# Patient Record
Sex: FEMALE | Race: BLACK OR AFRICAN AMERICAN | Employment: UNEMPLOYED | URBAN - METROPOLITAN AREA
[De-identification: names, ages, dates, MRNs, and addresses within clinical notes are randomized per-mention and may not be internally consistent; named-entity substitution may affect disease eponyms.]

---

## 2021-10-31 ENCOUNTER — APPOINTMENT (OUTPATIENT)
Dept: GENERAL RADIOLOGY | Age: 31
End: 2021-10-31
Attending: EMERGENCY MEDICINE
Payer: MEDICAID

## 2021-10-31 ENCOUNTER — HOSPITAL ENCOUNTER (OUTPATIENT)
Age: 31
Setting detail: OBSERVATION
Discharge: NURSING, REHAB OR LONG-TERM CARE | End: 2021-11-05
Attending: EMERGENCY MEDICINE | Admitting: STUDENT IN AN ORGANIZED HEALTH CARE EDUCATION/TRAINING PROGRAM
Payer: MEDICAID

## 2021-10-31 DIAGNOSIS — G62.9 NEUROPATHY: ICD-10-CM

## 2021-10-31 DIAGNOSIS — E86.0 DEHYDRATION: Primary | ICD-10-CM

## 2021-10-31 DIAGNOSIS — R26.2 INABILITY TO WALK: ICD-10-CM

## 2021-10-31 DIAGNOSIS — R44.3 HALLUCINATIONS: ICD-10-CM

## 2021-10-31 DIAGNOSIS — R53.1 WEAKNESS: ICD-10-CM

## 2021-10-31 DIAGNOSIS — R53.83 FATIGUE, UNSPECIFIED TYPE: ICD-10-CM

## 2021-10-31 LAB
BASOPHILS # BLD: 0.1 K/UL (ref 0–0.1)
BASOPHILS NFR BLD: 1 % (ref 0–1)
DIFFERENTIAL METHOD BLD: ABNORMAL
EOSINOPHIL # BLD: 0 K/UL (ref 0–0.4)
EOSINOPHIL NFR BLD: 0 % (ref 0–7)
ERYTHROCYTE [DISTWIDTH] IN BLOOD BY AUTOMATED COUNT: 16.4 % (ref 11.5–14.5)
HCT VFR BLD AUTO: 41.7 % (ref 35–47)
HGB BLD-MCNC: 14.2 G/DL (ref 11.5–16)
IMM GRANULOCYTES # BLD AUTO: 0.3 K/UL (ref 0–0.04)
IMM GRANULOCYTES NFR BLD AUTO: 4 % (ref 0–0.5)
LYMPHOCYTES # BLD: 2.7 K/UL (ref 0.8–3.5)
LYMPHOCYTES NFR BLD: 33 % (ref 12–49)
MCH RBC QN AUTO: 34.1 PG (ref 26–34)
MCHC RBC AUTO-ENTMCNC: 34.1 G/DL (ref 30–36.5)
MCV RBC AUTO: 100.2 FL (ref 80–99)
MONOCYTES # BLD: 0.4 K/UL (ref 0–1)
MONOCYTES NFR BLD: 5 % (ref 5–13)
NEUTS SEG # BLD: 4.8 K/UL (ref 1.8–8)
NEUTS SEG NFR BLD: 57 % (ref 32–75)
NRBC # BLD: 0.59 K/UL (ref 0–0.01)
NRBC BLD-RTO: 7.1 PER 100 WBC
PLATELET # BLD AUTO: 390 K/UL (ref 150–400)
PMV BLD AUTO: 10.5 FL (ref 8.9–12.9)
RBC # BLD AUTO: 4.16 M/UL (ref 3.8–5.2)
RBC MORPH BLD: ABNORMAL
WBC # BLD AUTO: 8.3 K/UL (ref 3.6–11)

## 2021-10-31 PROCEDURE — 99285 EMERGENCY DEPT VISIT HI MDM: CPT

## 2021-10-31 PROCEDURE — 85025 COMPLETE CBC W/AUTO DIFF WBC: CPT

## 2021-10-31 PROCEDURE — 96360 HYDRATION IV INFUSION INIT: CPT

## 2021-10-31 PROCEDURE — 96361 HYDRATE IV INFUSION ADD-ON: CPT

## 2021-10-31 PROCEDURE — 36415 COLL VENOUS BLD VENIPUNCTURE: CPT

## 2021-10-31 PROCEDURE — 71045 X-RAY EXAM CHEST 1 VIEW: CPT

## 2021-10-31 PROCEDURE — 74011250636 HC RX REV CODE- 250/636: Performed by: EMERGENCY MEDICINE

## 2021-10-31 RX ADMIN — SODIUM CHLORIDE 1000 ML: 9 INJECTION, SOLUTION INTRAVENOUS at 23:19

## 2021-11-01 ENCOUNTER — APPOINTMENT (OUTPATIENT)
Dept: CT IMAGING | Age: 31
End: 2021-11-01
Attending: EMERGENCY MEDICINE
Payer: MEDICAID

## 2021-11-01 ENCOUNTER — APPOINTMENT (OUTPATIENT)
Dept: MRI IMAGING | Age: 31
End: 2021-11-01
Attending: STUDENT IN AN ORGANIZED HEALTH CARE EDUCATION/TRAINING PROGRAM
Payer: MEDICAID

## 2021-11-01 ENCOUNTER — APPOINTMENT (OUTPATIENT)
Dept: VASCULAR SURGERY | Age: 31
End: 2021-11-01
Attending: STUDENT IN AN ORGANIZED HEALTH CARE EDUCATION/TRAINING PROGRAM
Payer: MEDICAID

## 2021-11-01 ENCOUNTER — APPOINTMENT (OUTPATIENT)
Dept: GENERAL RADIOLOGY | Age: 31
End: 2021-11-01
Attending: STUDENT IN AN ORGANIZED HEALTH CARE EDUCATION/TRAINING PROGRAM
Payer: MEDICAID

## 2021-11-01 PROBLEM — R53.1 RAPIDLY PROGRESSIVE WEAKNESS: Status: ACTIVE | Noted: 2021-11-01

## 2021-11-01 LAB
ALBUMIN SERPL-MCNC: 2.6 G/DL (ref 3.5–5)
ALBUMIN/GLOB SERPL: 0.8 {RATIO} (ref 1.1–2.2)
ALP SERPL-CCNC: 70 U/L (ref 45–117)
ALT SERPL-CCNC: 23 U/L (ref 12–78)
AMMONIA PLAS-SCNC: 20 UMOL/L
AMPHET UR QL SCN: NEGATIVE
ANION GAP SERPL CALC-SCNC: 16 MMOL/L (ref 5–15)
APPEARANCE UR: CLEAR
AST SERPL-CCNC: 43 U/L (ref 15–37)
BACTERIA URNS QL MICRO: ABNORMAL /HPF
BARBITURATES UR QL SCN: NEGATIVE
BENZODIAZ UR QL: NEGATIVE
BILIRUB SERPL-MCNC: 1.7 MG/DL (ref 0.2–1)
BILIRUB UR QL CFM: POSITIVE
BUN SERPL-MCNC: 42 MG/DL (ref 6–20)
BUN/CREAT SERPL: 49 (ref 12–20)
CALCIUM SERPL-MCNC: 8.2 MG/DL (ref 8.5–10.1)
CANNABINOIDS UR QL SCN: NEGATIVE
CHLORIDE SERPL-SCNC: 100 MMOL/L (ref 97–108)
CO2 SERPL-SCNC: 18 MMOL/L (ref 21–32)
COCAINE UR QL SCN: NEGATIVE
COLOR UR: ABNORMAL
CREAT SERPL-MCNC: 0.86 MG/DL (ref 0.55–1.02)
D DIMER PPP FEU-MCNC: 3.48 MG/L FEU (ref 0–0.65)
DRUG SCRN COMMENT,DRGCM: NORMAL
EPITH CASTS URNS QL MICRO: ABNORMAL /LPF
ETHANOL SERPL-MCNC: <10 MG/DL
GLOBULIN SER CALC-MCNC: 3.2 G/DL (ref 2–4)
GLUCOSE SERPL-MCNC: 95 MG/DL (ref 65–100)
GLUCOSE UR STRIP.AUTO-MCNC: NEGATIVE MG/DL
HCG SERPL-ACNC: <1 MIU/ML (ref 0–6)
HGB UR QL STRIP: NEGATIVE
KETONES UR QL STRIP.AUTO: 15 MG/DL
LEUKOCYTE ESTERASE UR QL STRIP.AUTO: ABNORMAL
MAGNESIUM SERPL-MCNC: 2.1 MG/DL (ref 1.6–2.4)
METHADONE UR QL: NEGATIVE
NITRITE UR QL STRIP.AUTO: POSITIVE
OPIATES UR QL: NEGATIVE
PCP UR QL: NEGATIVE
PH UR STRIP: 6 [PH] (ref 5–8)
POTASSIUM SERPL-SCNC: 4.4 MMOL/L (ref 3.5–5.1)
PROT SERPL-MCNC: 5.8 G/DL (ref 6.4–8.2)
PROT UR STRIP-MCNC: NEGATIVE MG/DL
RBC #/AREA URNS HPF: ABNORMAL /HPF (ref 0–5)
SODIUM SERPL-SCNC: 134 MMOL/L (ref 136–145)
SP GR UR REFRACTOMETRY: >1.03 (ref 1–1.03)
TSH SERPL DL<=0.05 MIU/L-ACNC: 4 UIU/ML (ref 0.36–3.74)
URATE SERPL-MCNC: 8.8 MG/DL (ref 2.6–6)
UROBILINOGEN UR QL STRIP.AUTO: 1 EU/DL (ref 0.2–1)
WBC URNS QL MICRO: ABNORMAL /HPF (ref 0–4)

## 2021-11-01 PROCEDURE — 83735 ASSAY OF MAGNESIUM: CPT

## 2021-11-01 PROCEDURE — 51702 INSERT TEMP BLADDER CATH: CPT

## 2021-11-01 PROCEDURE — 85379 FIBRIN DEGRADATION QUANT: CPT

## 2021-11-01 PROCEDURE — 80307 DRUG TEST PRSMV CHEM ANLYZR: CPT

## 2021-11-01 PROCEDURE — 82140 ASSAY OF AMMONIA: CPT

## 2021-11-01 PROCEDURE — 93971 EXTREMITY STUDY: CPT

## 2021-11-01 PROCEDURE — 97165 OT EVAL LOW COMPLEX 30 MIN: CPT | Performed by: OCCUPATIONAL THERAPIST

## 2021-11-01 PROCEDURE — 96361 HYDRATE IV INFUSION ADD-ON: CPT

## 2021-11-01 PROCEDURE — 81001 URINALYSIS AUTO W/SCOPE: CPT

## 2021-11-01 PROCEDURE — 71275 CT ANGIOGRAPHY CHEST: CPT

## 2021-11-01 PROCEDURE — 99218 HC RM OBSERVATION: CPT

## 2021-11-01 PROCEDURE — 93971 EXTREMITY STUDY: CPT | Performed by: INTERNAL MEDICINE

## 2021-11-01 PROCEDURE — 74011000636 HC RX REV CODE- 636: Performed by: EMERGENCY MEDICINE

## 2021-11-01 PROCEDURE — 87086 URINE CULTURE/COLONY COUNT: CPT

## 2021-11-01 PROCEDURE — 97530 THERAPEUTIC ACTIVITIES: CPT

## 2021-11-01 PROCEDURE — 74011250637 HC RX REV CODE- 250/637: Performed by: STUDENT IN AN ORGANIZED HEALTH CARE EDUCATION/TRAINING PROGRAM

## 2021-11-01 PROCEDURE — 80053 COMPREHEN METABOLIC PANEL: CPT

## 2021-11-01 PROCEDURE — 84702 CHORIONIC GONADOTROPIN TEST: CPT

## 2021-11-01 PROCEDURE — 97112 NEUROMUSCULAR REEDUCATION: CPT | Performed by: OCCUPATIONAL THERAPIST

## 2021-11-01 PROCEDURE — 84550 ASSAY OF BLOOD/URIC ACID: CPT

## 2021-11-01 PROCEDURE — 36415 COLL VENOUS BLD VENIPUNCTURE: CPT

## 2021-11-01 PROCEDURE — 70450 CT HEAD/BRAIN W/O DYE: CPT

## 2021-11-01 PROCEDURE — G0378 HOSPITAL OBSERVATION PER HR: HCPCS

## 2021-11-01 PROCEDURE — 74011250636 HC RX REV CODE- 250/636: Performed by: EMERGENCY MEDICINE

## 2021-11-01 PROCEDURE — 97162 PT EVAL MOD COMPLEX 30 MIN: CPT

## 2021-11-01 PROCEDURE — 70551 MRI BRAIN STEM W/O DYE: CPT

## 2021-11-01 PROCEDURE — 84443 ASSAY THYROID STIM HORMONE: CPT

## 2021-11-01 PROCEDURE — 82077 ASSAY SPEC XCP UR&BREATH IA: CPT

## 2021-11-01 PROCEDURE — 73630 X-RAY EXAM OF FOOT: CPT

## 2021-11-01 RX ORDER — COLCHICINE 0.6 MG/1
1.2 TABLET ORAL DAILY
Status: DISCONTINUED | OUTPATIENT
Start: 2021-11-01 | End: 2021-11-02

## 2021-11-01 RX ORDER — GABAPENTIN 100 MG/1
200 CAPSULE ORAL 4 TIMES DAILY
COMMUNITY

## 2021-11-01 RX ORDER — COLCHICINE 0.6 MG/1
0.6 TABLET ORAL ONCE
Status: COMPLETED | OUTPATIENT
Start: 2021-11-01 | End: 2021-11-01

## 2021-11-01 RX ORDER — ACETAMINOPHEN 325 MG/1
650 TABLET ORAL
Status: DISCONTINUED | OUTPATIENT
Start: 2021-11-01 | End: 2021-11-05 | Stop reason: HOSPADM

## 2021-11-01 RX ORDER — FOLIC ACID 1 MG/1
1 TABLET ORAL DAILY
Status: DISCONTINUED | OUTPATIENT
Start: 2021-11-01 | End: 2021-11-05 | Stop reason: HOSPADM

## 2021-11-01 RX ORDER — POLYETHYLENE GLYCOL 3350 17 G/17G
17 POWDER, FOR SOLUTION ORAL DAILY PRN
Status: DISCONTINUED | OUTPATIENT
Start: 2021-11-01 | End: 2021-11-05 | Stop reason: HOSPADM

## 2021-11-01 RX ORDER — SODIUM CHLORIDE 0.9 % (FLUSH) 0.9 %
5-40 SYRINGE (ML) INJECTION AS NEEDED
Status: DISCONTINUED | OUTPATIENT
Start: 2021-11-01 | End: 2021-11-05 | Stop reason: HOSPADM

## 2021-11-01 RX ORDER — SODIUM CHLORIDE 0.9 % (FLUSH) 0.9 %
5-40 SYRINGE (ML) INJECTION EVERY 8 HOURS
Status: DISCONTINUED | OUTPATIENT
Start: 2021-11-01 | End: 2021-11-05 | Stop reason: HOSPADM

## 2021-11-01 RX ORDER — ONDANSETRON 4 MG/1
4 TABLET, ORALLY DISINTEGRATING ORAL
Status: DISCONTINUED | OUTPATIENT
Start: 2021-11-01 | End: 2021-11-05 | Stop reason: HOSPADM

## 2021-11-01 RX ORDER — LANOLIN ALCOHOL/MO/W.PET/CERES
100 CREAM (GRAM) TOPICAL DAILY
Status: DISCONTINUED | OUTPATIENT
Start: 2021-11-01 | End: 2021-11-05 | Stop reason: HOSPADM

## 2021-11-01 RX ORDER — DULOXETIN HYDROCHLORIDE 30 MG/1
30 CAPSULE, DELAYED RELEASE ORAL DAILY
COMMUNITY

## 2021-11-01 RX ORDER — ENOXAPARIN SODIUM 100 MG/ML
40 INJECTION SUBCUTANEOUS DAILY
Status: DISCONTINUED | OUTPATIENT
Start: 2021-11-02 | End: 2021-11-05 | Stop reason: HOSPADM

## 2021-11-01 RX ORDER — ACETAMINOPHEN 650 MG/1
650 SUPPOSITORY RECTAL
Status: DISCONTINUED | OUTPATIENT
Start: 2021-11-01 | End: 2021-11-05 | Stop reason: HOSPADM

## 2021-11-01 RX ORDER — ONDANSETRON 2 MG/ML
4 INJECTION INTRAMUSCULAR; INTRAVENOUS
Status: DISCONTINUED | OUTPATIENT
Start: 2021-11-01 | End: 2021-11-05 | Stop reason: HOSPADM

## 2021-11-01 RX ORDER — CEPHALEXIN 250 MG/1
500 CAPSULE ORAL EVERY 6 HOURS
Status: DISCONTINUED | OUTPATIENT
Start: 2021-11-01 | End: 2021-11-02

## 2021-11-01 RX ADMIN — Medication 10 ML: at 16:21

## 2021-11-01 RX ADMIN — SODIUM CHLORIDE 1000 ML: 9 INJECTION, SOLUTION INTRAVENOUS at 01:41

## 2021-11-01 RX ADMIN — CEPHALEXIN 500 MG: 250 CAPSULE ORAL at 18:28

## 2021-11-01 RX ADMIN — ACETAMINOPHEN 650 MG: 325 TABLET ORAL at 19:01

## 2021-11-01 RX ADMIN — COLCHICINE 1.2 MG: 0.6 TABLET ORAL at 13:10

## 2021-11-01 RX ADMIN — CEPHALEXIN 500 MG: 250 CAPSULE ORAL at 13:10

## 2021-11-01 RX ADMIN — COLCHICINE 0.6 MG: 0.6 TABLET ORAL at 16:20

## 2021-11-01 RX ADMIN — Medication 10 ML: at 22:00

## 2021-11-01 RX ADMIN — IOPAMIDOL 100 ML: 755 INJECTION, SOLUTION INTRAVENOUS at 02:05

## 2021-11-01 RX ADMIN — FOLIC ACID 1 MG: 1 TABLET ORAL at 13:10

## 2021-11-01 RX ADMIN — Medication 100 MG: at 13:10

## 2021-11-01 NOTE — ED NOTES
Bedside and Verbal shift change report given to Kellee Jaquez (oncoming nurse) by Jeremy Veloz (offgoing nurse). Report included the following information SBAR.

## 2021-11-01 NOTE — ED PROVIDER NOTES
EMERGENCY DEPARTMENT HISTORY AND PHYSICAL EXAM      Date: 10/31/2021  Patient Name: Abby Amos    History of Presenting Illness     Chief Complaint   Patient presents with    Fatigue     numerous complaints       History Provided By: Patient    HPI: Abby Amos, 28 y.o. female with PMHx as noted below presents the emergency department for evaluation of fatigue and confusion. Patient states reason for coming to the hospital [facility] is because \"my  thought I was out of it\". Patient states she feels somewhat fatigued and generalized weakness with difficulty ambulating but notes this has been an ongoing issue for \"a while\". She also reports multiple other complaints including forgetfulness, balance issues but states she is currently under the care of a neurologist and this is being worked up on an outpatient basis, reports having MRIs, recent lumbar puncture. She reports to me that there are  no acute changes today but does state that her condition has been difficult to manage at home with her  is her caregiver. Patient states the reason she came in [facility] is because \"my  wanted me to get checked out\". Patient also mentions that she may be pregnant but is uncertain. Pt denies any other alleviating or exacerbating factors. Additionally, pt specifically denies any recent fever, chills, headache, nausea, vomiting, abdominal pain, CP, SOB, lightheadedness, dizziness, numbness, weakness, BLE swelling, heart palpitations, urinary sxs, diarrhea, constipation, melena, hematochezia, cough, or congestion. PCP: None    Current Outpatient Medications   Medication Sig Dispense Refill    traMADoL (ULTRAM) 50 mg tablet Take 1 Tablet by mouth every six (6) hours as needed (mod-severe pain) for up to 3 days. Max Daily Amount: 200 mg. 10 Tablet 0    metoprolol succinate (TOPROL-XL) 25 mg XL tablet Take 1 Tablet by mouth daily.  30 Tablet 0    melatonin 3 mg tablet Take 1 Tablet by mouth nightly as needed for Insomnia. 30 Tablet 0    allopurinoL (ZYLOPRIM) 100 mg tablet Take 1 Tablet by mouth daily. 30 Tablet 0    folic acid (FOLVITE) 1 mg tablet Take 1 mg by mouth daily.  ondansetron hcl (Zofran) 4 mg tablet Take 4 mg by mouth every eight (8) hours as needed for Nausea or Vomiting.  gabapentin (NEURONTIN) 100 mg capsule Take 200 mg by mouth four (4) times daily.  DULoxetine (CYMBALTA) 30 mg capsule Take 30 mg by mouth daily. Past History     Past Medical History:  Alcohol abuse, neuropathy, chronic weakness    Past Surgical History:  History reviewed. No pertinent surgical history. Family History:  History reviewed. No pertinent family history. Social History:  Social History     Tobacco Use    Smoking status: Never Smoker    Smokeless tobacco: Never Used   Substance Use Topics    Alcohol use: Yes     Comment: occasionally    Drug use: Not Currently       Allergies: Allergies   Allergen Reactions    Aspirin Swelling     Throat swelling         Review of Systems   Review of Systems  Constitutional: Negative for fever, chills. Positive fatigue. HENT: Negative for congestion, sore throat, rhinorrhea, sneezing and neck stiffness   Eyes: Negative for discharge and redness. Respiratory: Negative for  shortness of breath, wheezing   Cardiovascular: Negative for chest pain, palpitations   Gastrointestinal: Negative for nausea, vomiting, abdominal pain, constipation, diarrhea and blood in stool. Genitourinary: Negative for dysuria, hematuria, flank pain, decreased urine volume, discharge,   Musculoskeletal: Negative for myalgias or joint pain . Skin: Negative for rash or lesions . Neurological: Positive weakness. Negative lightheadedness and headaches. Physical Exam   Physical Exam    GENERAL: alert and oriented, no acute distress  EYES: PEERL, No injection, discharge or icterus. ENT: Mucous membranes dry  NECK: Supple  LUNGS: Airway patent. Non-labored respirations. Breath sounds clear with good air entry bilaterally. HEART: .  Mild tachycardia, regular rhythm, no peripheral edema  ABDOMEN: Non-distended and non-tender, without guarding or rebound. SKIN:  warm, dry  MSK/EXTREMITIES: Without swelling, tenderness or deformity, symmetric with normal ROM  NEUROLOGICAL:  alert and oriented x 3, CN II-XII grossly intact, strength 4/5 bilateral upper and lower extremities, sensation intact throughout to light touch, no dysmetria or ataxia noted      Diagnostic Study Results     Labs -  Reviewed and interpreted by me    Radiologic Studies -   MRI CERV SPINE W WO CONT   Final Result   No stenosis. Limited study as described above. MRI BRAIN W WO CONT   Final Result   Abnormal T2 signal within the splenium of the corpus callosum. No new lesions or   abnormal enhancement. Differential remains infectious or inflammatory   demyelinating or metabolic etiologies. Neoplasm is felt to be less likely      MRI BRAIN WO CONT   Final Result   Abnormal FLAIR signal intensity predominantly in the splenium of the corpus   callosum with mild associated diffusion restriction. The patient was   administered IV contrast. Specific imaging finding, differential etiologies   include infectious/inflammatory process, metabolic encephalopathy, demyelinating   processes. Clinical correlation. Consider contrast-enhanced MRI of the brain and cervical spine for further   delineation. There is no intracranial mass, hemorrhage . No additional acute intracranial process is demonstrated. XR FOOT LT MIN 3 V   Final Result   Soft tissue swelling and diffuse osteopenia. No focal bone   destruction. .      DUPLEX LOWER EXT VENOUS LEFT   Final Result      CT HEAD WO CONT   Final Result   No significant abnormalities. CTA CHEST W OR W WO CONT   Final Result   No pulmonary embolus or other acute cardiopulmonary process. .      XR CHEST PORT   Final Result Normal chest.        CT Results  (Last 48 hours)    None        CXR Results  (Last 48 hours)    None            Medical Decision Making     I, Nabil Martinez MD am the first provider for this patient and am the attending of record for this patient encounter. I reviewed the vital signs, available nursing notes, past medical history, past surgical history, family history and social history. Vital Signs-Reviewed the patient's vital signs. No data found. Pulse Oximetry Analysis - 100% on RA      Records Reviewed: Nursing Notes and Old Medical Records    Provider Notes (Medical Decision Making): On presentation, the patient is well appearing, in no acute distress noted to be tachycardic on arrival.  Patient presenting here with reported progressive weakness over the last 1 to 2 years states that things are being difficult to manage at home, notes that her  felt that she was confused earlier today although patient is alert and oriented on my assessment. Patient reportedly has had all of this evaluated and worked up extensively by both neurology and rheumatology as an outpatient although I do not have access to these records reports recent MRIs and lumbar puncture with unclear diagnosis for the patient. Differential included a demyelinating disorder, CNS pathology, stroke, metabolic encephalopathy, infectious process, spinal cord lesion, other neuromuscular disorder. Basic labs were notable for a mild metabolic acidosis, felt likely dehydration as her mild tachycardia did improve with IV fluids. CT imaging showed no acute findings on my interpretation. Based on history and evaluation, no clear acute conditions identified at this time however I did recommend admission with the patient given her significant ambulatory dysfunction she may ultimately require placement in a rehab facility.   Patient declining admission at this time stating that she would like to go home and reports that she will call her  to come pick her up. Seeing as there appears to be nothing acute with her presentation today feel this is reasonable. ED Course:   Initial assessment performed. The patients presenting problems have been discussed, and they are in agreement with the care plan formulated and outlined with them. I have encouraged them to ask questions as they arise throughout their visit. Medications   sodium chloride 0.9 % bolus infusion 1,000 mL (0 mL IntraVENous IV Completed 11/1/21 0030)   sodium chloride 0.9 % bolus infusion 1,000 mL (0 mL IntraVENous IV Completed 11/1/21 0412)   iopamidoL (ISOVUE-370) 76 % injection 100 mL (100 mL IntraVENous Given 11/1/21 0205)   colchicine tablet 0.6 mg (0.6 mg Oral Given 11/1/21 1620)   colchicine tablet 0.6 mg (0.6 mg Oral Given 11/4/21 0859)   gadoteridoL (PROHANCE) 279.3 mg/mL contrast solution 19 mL (19 mL IntraVENous Given 11/2/21 1545)   methylPREDNISolone (Solu-MEDROL) 500 mg in 100 mL NS MBP (0 mg IntraVENous Stopped 11/5/21 1030)   potassium bicarb-citric acid (EFFER-K) tablet 50 mEq (50 mEq Oral Given 11/4/21 1119)   methylPREDNISolone (Solu-MEDROL) 500 mg in 100 mL NS MBP (500 mg IntraVENous New Bag 11/4/21 1808)   gadoteridoL (PROHANCE) 279.3 mg/mL contrast solution 20 mL (20 mL IntraVENous Given 11/4/21 1702)   LORazepam (ATIVAN) injection 1 mg (1 mg IntraVENous Given 11/4/21 1649)          SIGN OUT:  Patient's presentation, labs/imaging and plan of care was reviewed with Dr. Hakeem Kingston awaiting transportation home. Dr. Chico Alejandro assistance in completion of this plan is greatly appreciated but it should be noted that I will be the provider of record for this patient. Rosana Kennedy MD      ED Course as of 11/07/21 1108   Mon Nov 01, 2021   1102 Progress Note  11:02 AM  I was told by nursing that patient is unable to transfer from the wheelchair to the car. This patient was signed out to me waiting for transportation home.   After speaking to both the patient and her , she has had a demyelinating disease for some time that has worsened over the past week to the point that she can't walk. Will discuss with Neurology and hospitalist. [MS]   1107 11:07 AM  Debbie Ferreira MD spoke with Dr. Rand Hanna for Neurology. Discussed HPI and PE, available diagnostic tests and clinical findings. He is in agreement with care plans as outlined. He recommends admitting her to the hospital for further evaluation and work-up. He recommends MRI. If nothing is found, he recommends a psychiatry consult. [MS]      ED Course User Index  [MS] Dominique Garvey MD     Critical Care Note      IMPENDING DETERIORATION -Cardiovascular and Metabolic  ASSOCIATED RISK FACTORS - Dehydration  MANAGEMENT- Bedside Assessment and Supervision of Care  INTERPRETATION -  CT Scan, ECG and Blood Pressure  INTERVENTIONS - hemodynamic mngmt-IV fluid boluses x2  CASE REVIEW - Hospitalist/Intensivist  TREATMENT RESPONSE -Stable  PERFORMED BY - Gisselle Najera MD    NOTES   :  I have provided a total of 45 minutes of critical time not including time spent on separately documented procedures. The reason for providing this level of medical care for this critically ill patient was due to a critical illness that impaired one or more vital organ systems such that there was a high probability of imminent or life threatening deterioration in the patients condition. This care involved high complexity decision making to assess, manipulate, and support vital system functions,  lab review, consultations with specialist, family decision- making, bedside attention and documentation. During this entire length of time I was immediately available to the patient      Admit Note  Patient is being admitted to the hospitalist.  The results of their tests and reasons for their admission have been discussed with them and/or available family.   They convey agreement and understanding for the need to be admitted and for their admission diagnosis. Consultation has been made with the inpatient physician specialist for hospitalization. Disposition:  admit    PLAN:  1. admit      Diagnosis     Clinical Impression:   1. Dehydration    2. Fatigue, unspecified type    3. Neuropathy    4. Hallucinations    5. Weakness    6. Inability to walk        Please note that this dictation was completed with Dragon, computer voice recognition software. Quite often unanticipated grammatical, syntax, homophones, and other interpretive errors are inadvertently transcribed by the computer software. Please disregard these errors. Additionally, please excuse any errors that have escaped final proofreading.

## 2021-11-01 NOTE — PROGRESS NOTES
[facility] Admission Pharmacy Medication Reconciliation- IN PROGRESS    Information obtained from: Liališgayle Solano ([phone])   RxQuery data available1: Yes     Comments/recommendations:    Unable to perform medication reconciliation with patient due to patient condition at this time. Compliance could not be assessed. Pharmacist to re-attempt to interview patient tomorrow.  PTA medication list updated from most recent dispense history from [facility]  ([phone]).  Patient picked up 30 days supply of gabapentin on 10/24/21 and duloxetine on 10/12/21.  No other medications were found on patient profile. Medication changes (since last review): Added   Gabapentin   Duloxetine  Removed   None  Adjusted   None    The [place] Prescription Monitoring Program () was accessed to determine fill history of any controlled medications. Last filled Gabapentin 100 mg Qty 240; Qty 30 on 10/23/21.    1RxQuery pharmacy benefit data reflects medications filled and processed through the patient's insurance, however                this data does NOT capture whether the medication was picked up or is currently being taken by the patient. Patient allergies: Allergies as of 10/31/2021 - Fully Reviewed 10/31/2021   Allergen Reaction Noted    Aspirin Swelling 10/31/2021     Prior to Admission Medications   Prescriptions Last Dose Informant Patient Reported? Taking? DULoxetine (CYMBALTA) 30 mg capsule Unknown at Unknown time Other Yes No   Sig: Take 30 mg by mouth daily. gabapentin (NEURONTIN) 100 mg capsule Unknown at Unknown time Other Yes No   Sig: Take 200 mg by mouth four (4) times daily.       Facility-Administered Medications: None     Thank you,     Krystle Peres, PharmD   Contact: [phone]

## 2021-11-01 NOTE — H&P
Hospitalist Admission Note    NAME: Dulce Ragland   :  1990   MRN:  [id number]   Room Number: ER03/03  @ [facility]     Date/Time:  2021 11:15 AM    Patient PCP: None  ______________________________________________________________________  Given the patient's current clinical presentation, I have a high level of concern for decompensation if discharged from the emergency department. Complex decision making was performed, which includes reviewing the patient's available past medical records, laboratory results, and x-ray films. My assessment of this patient's clinical condition and my plan of care is as follows. Assessment / Plan:        Active Problems:    Rapidly progressive weakness (2021)        #Alcohol induced neuropathy  #Chronic lower extremity weakness  -CT head negative for acute process  -Extensive work-up done at [facility]-mostly unremarkable  - LP done 3/1/2021: WBC 2 protein 24.3 glucose 63  -VDRL CSF nonreactive nonreactive, herpes 1 and 2 PCR CSF negative  -HIV neg  2021  -Hepatitis B and C test  Neg 2020  -Double-stranded DNA negative, centromere antibody negative, SSA SS- B antibody negative Aurora 1 antibody - 2021  -Scleroderma 70- 2.9  -MRI pending  -Neurology consulted  -Check B12 folate RPR  -Follow free T4  -PT OT recommend acute rehab      #UTI  -UA with bacteria, leukoesterase and positive nitrites  -Urine culture pending  -Keflex for 3 days    # Macrocytosis suspect secondary to alcohol use  -  -B12 [id number]  -Thiamine folic acid    #Left foot gout attack  -Check uric acid  -Colchicine 1.2 mg and then colchicine 0.6 mg an hour after    #Alcohol use disorder  -Patient states she was a daily drinker until her first born child and then she drinks couple of times a week  -Patient history of alcohol withdrawal in the past  -CIWA protocol without Ativan  -Thiamine folic acid    Body mass index is 38.79 kg/m². Code Status: Full   Surrogate Decision Maker:    DVT Prophylaxis: Lovenox  GI Prophylaxis: not indicated          Subjective:   CHIEF COMPLAINT: Chronic leg weakness     HISTORY OF PRESENT ILLNESS:     Marika Nguyen is a 28 y.o.  female with PMH of above-mentioned problems who presents to ED with c/o *lower extremity weakness and unable to take care of herself at home. Patient states that she has been diagnosed with neuropathy 4 years ago and she has been declining for past 1-1/2-year. Patient is wheelchair-bound and is taken care of by her  at home with 3 kids. Patient today denied any active chest pain belly pain difficulty breathing or shortness of breath. Patient endorsed pain in left foot. Patient stated that she used to drink alcohol every single day and used to have alcohol withdrawal when she would remain abstinent from alcohol. But after her first born she has cut back and now she drinks few days a week. Last drink was yesterday. Patient  was not able to take care of her at home at this time. We were asked to admit for work up and evaluation of the above problems. History reviewed. No pertinent past medical history. History reviewed. No pertinent surgical history. Social History     Tobacco Use    Smoking status: Never Smoker    Smokeless tobacco: Never Used   Substance Use Topics    Alcohol use: Yes     Comment: occasionally        History reviewed. No pertinent family history. Allergies   Allergen Reactions    Aspirin Swelling     Throat swelling        Prior to Admission medications    Not on File       REVIEW OF SYSTEMS:     I am not able to complete the review of systems because:    The patient is intubated and sedated    The patient has altered mental status due to his acute medical problems    The patient has baseline aphasia from prior stroke(s)    The patient has baseline dementia and is not reliable historian The patient is in acute medical distress and unable to provide information           Total of 12 systems reviewed as follows:       POSITIVE= underlined text  Negative = text not underlined  General:  fever, chills, sweats, generalized weakness, weight loss/gain,      loss of appetite   Eyes:    blurred vision, eye pain, loss of vision, double vision  ENT:    rhinorrhea, pharyngitis   Respiratory:   cough, sputum production, SOB, GARCIA, wheezing, pleuritic pain   Cardiology:   chest pain, palpitations, orthopnea, PND, edema, syncope   Gastrointestinal:  abdominal pain , N/V, diarrhea, dysphagia, constipation, bleeding   Genitourinary:  frequency, urgency, dysuria, hematuria, incontinence   Muskuloskeletal :  arthralgia, myalgia, back pain  Hematology:  easy bruising, nose or gum bleeding, lymphadenopathy   Dermatological: rash, ulceration, pruritis, color change / jaundice  Endocrine:   hot flashes or polydipsia   Neurological:  headache, dizziness, confusion, focal weakness, paresthesia,     Speech difficulties, memory loss, gait difficulty  Psychological: Feelings of anxiety, depression, agitation    Objective:   VITALS:    Visit Vitals  BP (!) 120/102   Pulse (!) 115   Temp 98 °F (36.7 °C)   Resp 20   Ht 5' 3\" (1.6 m)   Wt 99.3 kg (219 lb)   SpO2 100%   BMI 38.79 kg/m²       PHYSICAL EXAM:    General:    Sleepy no distress, appears stated age. HEENT: Atraumatic, anicteric sclerae, pink conjunctivae     No oral ulcers, mucosa moist, throat clear, dentition fair  Neck:  Supple, symmetrical,  thyroid: non tender  Lungs:   Clear to auscultation bilaterally. No Wheezing or Rhonchi. No rales. Chest wall:  No tenderness  No Accessory muscle use. Heart:   Regular  rhythm,  No  murmur   No edema  Abdomen:   Soft, non-tender. Not distended. Bowel sounds normal  Extremities: No cyanosis. No clubbing,      Skin turgor normal, Capillary refill normal, Radial dial pulse 2+  Skin:     Not pale.   Not Jaundiced  No rashes , left foot red tender to touch and swollen  Psych:  Good insight. Not depressed. Not anxious or agitated. Neurologic: EOMs intact. No facial asymmetry. No aphasia or slurred speech. Symmetrical strength, Sensation grossly intact. Alert and oriented X 3.     ______________________________________________________________________    Care Plan discussed with:  Patient/Family    Expected  Disposition:  SNF/LTC  ________________________________________________________________________  TOTAL TIME:  39 Minutes    Critical Care Provided     Minutes non procedure based      Comments    x Reviewed previous records   >50% of visit spent in counseling and coordination of care x Discussion with patient and/or family and questions answered       ________________________________________________________________________  Signed: Miah Bolivar MD    Procedures: see electronic medical records for all procedures/Xrays and details which were not copied into this note but were reviewed prior to creation of Plan. LAB DATA REVIEWED:    Recent Results (from the past 24 hour(s))   CBC WITH AUTOMATED DIFF    Collection Time: 10/31/21 11:09 PM   Result Value Ref Range    WBC 8.3 3.6 - 11.0 K/uL    RBC 4.16 3.80 - 5.20 M/uL    HGB 14.2 11.5 - 16.0 g/dL    HCT 41.7 35.0 - 47.0 %    .2 (H) 80.0 - 99.0 FL    MCH 34.1 (H) 26.0 - 34.0 PG    MCHC 34.1 30.0 - 36.5 g/dL    RDW 16.4 (H) 11.5 - 14.5 %    PLATELET 841 107 - 426 K/uL    MPV 10.5 8.9 - 12.9 FL    NRBC 7.1 (H) 0  WBC    ABSOLUTE NRBC 0.59 (H) 0.00 - 0.01 K/uL    NEUTROPHILS 57 32 - 75 %    LYMPHOCYTES 33 12 - 49 %    MONOCYTES 5 5 - 13 %    EOSINOPHILS 0 0 - 7 %    BASOPHILS 1 0 - 1 %    IMMATURE GRANULOCYTES 4 (H) 0.0 - 0.5 %    ABS. NEUTROPHILS 4.8 1.8 - 8.0 K/UL    ABS. LYMPHOCYTES 2.7 0.8 - 3.5 K/UL    ABS. MONOCYTES 0.4 0.0 - 1.0 K/UL    ABS. EOSINOPHILS 0.0 0.0 - 0.4 K/UL    ABS. BASOPHILS 0.1 0.0 - 0.1 K/UL    ABS. IMM.  GRANS. 0.3 (H) 0.00 - 0.04 K/UL    DF SMEAR SCANNED      RBC COMMENTS ANISOCYTOSIS  1+       AMMONIA    Collection Time: 11/01/21 12:38 AM   Result Value Ref Range    Ammonia 20 <32 UMOL/L   BETA HCG, QT    Collection Time: 11/01/21 12:38 AM   Result Value Ref Range    Beta HCG, QT <1 0 - 6 MIU/ML   ETHYL ALCOHOL    Collection Time: 11/01/21 12:38 AM   Result Value Ref Range    ALCOHOL(ETHYL),SERUM <10 <48 MG/DL   METABOLIC PANEL, COMPREHENSIVE    Collection Time: 11/01/21 12:38 AM   Result Value Ref Range    Sodium 134 (L) 136 - 145 mmol/L    Potassium 4.4 3.5 - 5.1 mmol/L    Chloride 100 97 - 108 mmol/L    CO2 18 (L) 21 - 32 mmol/L    Anion gap 16 (H) 5 - 15 mmol/L    Glucose 95 65 - 100 mg/dL    BUN 42 (H) 6 - 20 MG/DL    Creatinine 0.86 0.55 - 1.02 MG/DL    BUN/Creatinine ratio 49 (H) 12 - 20      GFR est AA >60 >60 ml/min/1.73m2    GFR est non-AA >60 >60 ml/min/1.73m2    Calcium 8.2 (L) 8.5 - 10.1 MG/DL    Bilirubin, total 1.7 (H) 0.2 - 1.0 MG/DL    ALT (SGPT) 23 12 - 78 U/L    AST (SGOT) 43 (H) 15 - 37 U/L    Alk.  phosphatase 70 45 - 117 U/L    Protein, total 5.8 (L) 6.4 - 8.2 g/dL    Albumin 2.6 (L) 3.5 - 5.0 g/dL    Globulin 3.2 2.0 - 4.0 g/dL    A-G Ratio 0.8 (L) 1.1 - 2.2     D DIMER    Collection Time: 11/01/21 12:38 AM   Result Value Ref Range    D-dimer 3.48 (H) 0.00 - 0.65 mg/L FEU   MAGNESIUM    Collection Time: 11/01/21 12:38 AM   Result Value Ref Range    Magnesium 2.1 1.6 - 2.4 mg/dL   TSH 3RD GENERATION    Collection Time: 11/01/21 12:38 AM   Result Value Ref Range    TSH 4.00 (H) 0.36 - 3.74 uIU/mL   URINALYSIS W/ RFLX MICROSCOPIC    Collection Time: 11/01/21  5:09 AM   Result Value Ref Range    Color ORANGE      Appearance CLEAR CLEAR      Specific gravity >1.030 (H) 1.003 - 1.030    pH (UA) 6.0 5.0 - 8.0      Protein Negative NEG mg/dL    Glucose Negative NEG mg/dL    Ketone 15 (A) NEG mg/dL    Blood Negative NEG      Urobilinogen 1.0 0.2 - 1.0 EU/dL    Nitrites Positive (A) NEG      Leukocyte Esterase TRACE (A) NEG     DRUG SCREEN, URINE    Collection Time: 11/01/21  5:09 AM   Result Value Ref Range    AMPHETAMINES Negative NEG      BARBITURATES Negative NEG      BENZODIAZEPINES Negative NEG      COCAINE Negative NEG      METHADONE Negative NEG      OPIATES Negative NEG      PCP(PHENCYCLIDINE) Negative NEG      THC (TH-CANNABINOL) Negative NEG      Drug screen comment (NOTE)    BILIRUBIN, CONFIRM    Collection Time: 11/01/21  5:09 AM   Result Value Ref Range    Bilirubin UA, confirm Positive (A) NEG     URINE MICROSCOPIC ONLY    Collection Time: 11/01/21  5:09 AM   Result Value Ref Range    WBC 0-4 0 - 4 /hpf    RBC 0-5 0 - 5 /hpf    Epithelial cells FEW FEW /lpf    Bacteria 1+ (A) NEG /hpf

## 2021-11-01 NOTE — PROGRESS NOTES
Problem: Mobility Impaired (Adult and Pediatric)  Goal: *Acute Goals and Plan of Care (Insert Text)  Description: FUNCTIONAL STATUS PRIOR TO ADMISSION: Patient was modified independent using a single point cane for functional mobility. Driving up until a few months ago when her vision got blurry, In the last week weakness became progress to the point where she was mobilizing in a wheelchair    [address] [facility]: The patient lived with  and 3 young children. Physical Therapy Goals  Initiated 11/1/2021  1. Patient will move from supine to sit and sit to supine  in bed with moderate assistance  within 7 day(s). 2.  Patient will transfer from bed to chair and chair to bed with moderate assistance  using the least restrictive device within 7 day(s). 3.  Patient will perform sit to stand with moderate assistance  within 7 day(s). 4.  Patient will sit EOB with CGA for 10min within 7 days     Outcome: Not Progressing Towards Goal     PHYSICAL THERAPY EVALUATION  Patient: [place] (89 y.o. female)  Date: 11/1/2021  Primary Diagnosis: Rapidly progressive weakness [R53.1]        Precautions:   Fall, Skin      ASSESSMENT  Based on the objective data described below, the patient presents with gross weakness in all extremities, impaired balance and coordination, impaired sensation, impaired cognition all limiting functional mobility. Weakness has been progressive over the past week but  reports neurologic symptoms starting over the last few months (neuropathy, blurry vision). Prior to decline patient was mod I with a cane but in the last week she has been wheelchair bound. Still pending imaging and neuro work. Anticipate that she will need rehab at discharge. Current Level of Function Impacting Discharge (mobility/balance): max-total A    Functional Outcome Measure:   The patient scored Total: 20/100 on the Barthel Index which is indicative of 80%% impaired ability to care for basic self needs/dependency on others. Other factors to consider for discharge: Neuro workup     Patient will benefit from skilled therapy intervention to address the above noted impairments. PLAN :  Recommendations and Planned Interventions: bed mobility training, transfer training, therapeutic exercises, neuromuscular re-education, patient and family training/education, and therapeutic activities      Frequency/Duration: Patient will be followed by physical therapy:  5 times a week to address goals. Recommendation for discharge: (in order for the patient to meet his/her long term goals)  Therapy 3 hours per day 5-7 days per week    This discharge recommendation:  Has been made in collaboration with the attending provider and/or case management    IF patient discharges home will need the following DME: hospital bed and mechanical lift         SUBJECTIVE:   Patient stated Graciery Spittle my phone.  Patient was not holding a phone,    OBJECTIVE DATA SUMMARY:   HISTORY:    History reviewed. No pertinent past medical history. History reviewed. No pertinent surgical history. Personal factors and/or comorbidities impacting plan of care:     Home Situation  Home Environment: Private residence  # Steps to Enter: 4  Rails to Enter: Yes  Hand Rails : Left  One/Two Story Residence: One story  Living Alone: No  Support Systems: Parent(s), Spouse/Significant Other, Child(elizabeth) (pt lives with  and their 3 children ages 11 and 1)  Patient Expects to be Discharged to[de-identified] Rehabilitation facility  Current DME Used/Available at Home: Cane, straight  Tub or Shower Type: Tub/Shower combination    EXAMINATION/PRESENTATION/DECISION MAKING:   Critical Behavior:  Neurologic State: Alert  Orientation Level: Oriented to person, Oriented to place  Cognition: Decreased attention/concentration, Follows commands  Safety/Judgement: Awareness of environment, Decreased insight into deficits, Fall prevention  Hearing:   Auditory  Auditory Impairment: None  Skin:  NT  Edema: NT  Range Of Motion:  AROM: Generally decreased, functional (UEs 3-/5 except hands and unable to ; LEs 2- to 3-/5)           PROM: Generally decreased, functional           Strength:    Strength: Generally decreased, functional (UEs 3-/5 except hands and unable to ; LEs 2- to 3-/5)                    Tone & Sensation:   Tone: Abnormal              Sensation: Impaired (BUEs and LEs- pt states h/o peropheral neuropathy)               Coordination:  Coordination: Grossly decreased, non-functional  Vision:   Acuity:  (NT- eyes closed due to light sensitivity per pt)  Functional Mobility:  Bed Mobility:  Rolling: Total assistance  Supine to Sit: Total assistance  Sit to Supine: Total assistance  Scooting: Total assistance  Transfers:  Sit to Stand: Total assistance; Additional time;Assist x2 (pt unable to WB through BLEs- autumn lift used)  Stand to Sit: Total assistance        Bed to Chair: Total assistance (pt unable to WB through BLEs- autumn lift used)              Balance:   Sitting: Impaired; With support  Sitting - Static: Fair (occasional)  Sitting - Dynamic: Poor (constant support)  Standing: Impaired;Pull to stand; With support  Standing - Static: Poor;Constant support  Standing - Dynamic : Poor;Constant support      Functional Measure:  Barthel Index:    Bathin  Bladder: 5  Bowels: 10  Groomin  Dressin  Feedin  Mobility: 0  Stairs: 0  Toilet Use: 0  Transfer (Bed to Chair and Back): 5  Total: 20/100       The Barthel ADL Index: Guidelines  1. The index should be used as a record of what a patient does, not as a record of what a patient could do. 2. The main aim is to establish degree of independence from any help, physical or verbal, however minor and for whatever reason. 3. The need for supervision renders the patient not independent. 4. A patient's performance should be established using the best available evidence.  Asking the patient, friends/relatives and nurses are the usual sources, but direct observation and common sense are also important. However direct testing is not needed. 5. Usually the patient's performance over the preceding 24-48 hours is important, but occasionally longer periods will be relevant. 6. Middle categories imply that the patient supplies over 50 per cent of the effort. 7. Use of aids to be independent is allowed. Jerica Vogt., Barthel, DJonW. (3635). Functional evaluation: the Barthel Index. [facility] (14)2. Sukumar Isaacs ger UMAIR Troo, Alden Lo., Lucila Bautista., Tatyana, [address] Measuring the change indisability after inpatient rehabilitation; comparison of the responsiveness of the Barthel Index and Functional [place] Measure. Journal of Neurology, Neurosurgery, and Psychiatry, 66(4), 784-937. Mushtaq Negron, N.J.A, RAMYA Saenz, & Shreya Dean M.A. (2004.) Assessment of post-stroke quality of life in cost-effectiveness studies: The usefulness of the Barthel Index and the EuroQoL-5D.  Quality of Life Research, 15, 971-04        Physical Therapy Evaluation Charge Determination   History Examination Presentation Decision-Making   MEDIUM  Complexity : 1-2 comorbidities / personal factors will impact the outcome/ POC  MEDIUM Complexity : 3 Standardized tests and measures addressing body structure, function, activity limitation and / or participation in recreation  MEDIUM Complexity : Evolving with changing characteristics  HIGH Complexity : FOTO score of 1- 25       Based on the above components, the patient evaluation is determined to be of the following complexity level: MEDIUM    Pain Ratin-6 left foot when touched    Activity Tolerance:   Fair    After treatment patient left in no apparent distress:   Supine in bed, Call bell within reach, and Caregiver / family present    COMMUNICATION/EDUCATION:   The patients plan of care was discussed with: Occupational therapist and Registered nurse.     Fall prevention education was provided and the patient/caregiver indicated understanding., Patient/family have participated as able in goal setting and plan of care. , and Patient/family agree to work toward stated goals and plan of care.     Thank you for this referral.  True Lower, PT   Time Calculation: 25 mins

## 2021-11-01 NOTE — PROGRESS NOTES
Problem: Patient Education: Go to Patient Education Activity  Goal: Patient/Family Education  Outcome: Progressing Towards Goal     Problem: Falls - Risk of  Goal: *Absence of Falls  Description: Document Srini Cote Fall Risk and appropriate interventions in the flowsheet.   Outcome: Progressing Towards Goal  Note: Fall Risk Interventions:       Mentation Interventions: Evaluate medications/consider consulting pharmacy, More frequent rounding    Medication Interventions: Evaluate medications/consider consulting pharmacy    Elimination Interventions: Call light in reach    History of Falls Interventions: Door open when patient unattended, Room close to nurse's station         Problem: Patient Education: Go to Patient Education Activity  Goal: Patient/Family Education  Outcome: Progressing Towards Goal

## 2021-11-01 NOTE — ED NOTES
Pt report to Maria E Hooper. Reviewed results, Mar and Assessments; Given a chance to ask questions.

## 2021-11-01 NOTE — PROGRESS NOTES
1407 Patient arrived from ED. Patient transferred to bed via autumn lift. Patients vitals obtained and provider notified of the HR. Placed pads on rails of bed because patient on seizure precautions. Patient requested water, this was given to patient. No other needs at this time. Will complete admission history and assessment. 1417 Patients MRI checklist completed and MRI notified. Patient A&Ox2. Patient is on room air. And has no other complaints at this time. 1815 Patient lab draw attempted but unable to obtain. Patient  at bedside and stated that patient has been liek this for one week. Patients  stated that patient has not drank in at least a month and is using no other recreational drugs that he is aware of. Patient has had no medication changes. MRI results are still pending.

## 2021-11-01 NOTE — ED TRIAGE NOTES
Patient states she has peripheral neuropathy and is having tingling in bilateral hands and legs. Denies any other complaints at this time. Patient states she is pregnant.

## 2021-11-01 NOTE — ED NOTES
Assisted pt into [facility] with the help of a tech and pts ; pt is a full assist; pt rolled out in [facility] to her vehicle; this nurse and PCT attempted to assist pt into the vehicle;pt is unable to bear weight and  pt is unable to help with any portion of this; pts  states that she has been unable to walk x 1 week and he contacted her Md last week but he couldn't see her till this week;  reports that she has been in bed the past week and he has done everything for her. Pt states that she can get herself up at home, she states she slides out of her bed to the [facility] and then takes herself to the bathroom; pts  shaking his head saying this is not true; [facility] reasons; pt wheeled back into the dept. Explained to Dr. Juan Alberto Will that pt is not safe to be dcd due to the in ablility to move and assist with transfers as  states he cant not move her himself. Pt is alert and oriented x 4(pt initially though Omar was president). Pt is hypersensitive to pain with any touch; pt continued to say her feet were hurting during transfering her to the [facility]. Pts  states that she has peripheral neuropathy.

## 2021-11-01 NOTE — PROGRESS NOTES
Case opened of discharge planning. HISTORY OF PRESENT ILLNESS:     Danilo Duran is a 28 y.o.  female with PMH of above-mentioned problems who presents to ED with c/o *lower extremity weakness and unable to take care of herself at home    Complete assessment to follow.   AMALIA Neal/BILLY  [phone]

## 2021-11-01 NOTE — ED NOTES
Patient given discharge paperwork. She is currently locked out of her iphone and unable to call her  as she does not remember the phone number. Patient states she has family members at home who are there to help her get in the house.

## 2021-11-01 NOTE — ED NOTES
Contacted , Adriana Rush, [phone] to  patient. He will get someone to watch kids, bring wheelchair and call us when he is on the way.

## 2021-11-01 NOTE — PROGRESS NOTES
Received a call from nursing at 7:57Am  Regarding this patient. Patient in ER ready for discharged. Waiting for spouse to make arrangement to pick her up. She is wheel chair bound. Reviewed the chart and called the spouse., Melba Perry, [phone]   He indicates they are from [place] -, moved her about a month ago. Asked about his wife condition. He said she has been the way , unable to walk for a little over a week. He has taken her to the ER at [facility] twice and here   He has a Wheel Chair. Asked about PCP  He said her provider is out and been assigned someone else in the [place]. Dr. Brittney Thomas [phone]. He has an appointment on Wednesday. Discussed the last time he has been in this office. He said a little over a month ago. If he has been assigned a new provider this one may not agree any orders until seen in the office. He understand it is critical she eeps this appointment. Talked about him connecting with his wife's  to call the # on the back of the card. I will see if I can reach out to see if this person can call them. Asked Nursing to get Home Health Orders written- hoping the new provider will sign off on them. Important for him to talk to the Coordinator with the insurance co so this person can arrange for Prescreening to be done through the Health Department. This is not normally done in the ER. [facility] MSCHING RN     [phone]     Addendum:  9'27 AM  Left message for Insurance Co to call patient's spouse   Boubacarjovanna Proctor /Care Coordinator [phone]    Addendum [de-identified] 2:47PM     Patient admitted to hospital for OBS and work-up     PT and OT assessment- recommend Acute Rehab. Information to be sent to a few facilities once CM meet with patient and talk to spouse about choice of agencies. Will need [facility]. To be completed.

## 2021-11-01 NOTE — ED NOTES
Pt c/o left foot pain; removed pts sock and left foot is warm to touch, +erythema, +swelling; + black spot to big toe on left foot near nail bed; + pulse via doppler.

## 2021-11-01 NOTE — PROGRESS NOTES
Problem: Self Care Deficits Care Plan (Adult)  Goal: *Acute Goals and Plan of Care (Insert Text)  Description: FUNCTIONAL STATUS PRIOR TO ADMISSION: Patient was modified independent using a single point cane for functional mobility up until 1 week ago. Pt's mother bough her a w/c 1 week ago, because she couldn't walk. Pt with progressive decreased vision x3 months and unable to drive. Pt's  states pt sees a neurologist in [place]. HOME SUPPORT: The patient lived with  but did not require assist until 1 week ago. Occupational Therapy Goals  Initiated 11/1/2021   1. Patient will perform self-feeding with minimal assistance/contact guard assist within 7 day(s). 2.  Patient will perform grooming with minimal assistance/contact guard assist within 7 day(s). 3.  Patient will perform upper body dressing with minimal assistance/contact guard assist within 7 day(s). 4.  Patient will perform toilet transfers with moderate assistance x2 to drop arm BSC within 7 day(s). 5.  Patient will perform all aspects of toileting with maximal assistance within 7 day(s). 6.  Patient will participate in upper extremity therapeutic exercise/activities with minimal assistance/contact guard assist within 7 day(s). 7.  Patient will utilize energy conservation techniques during functional activities with verbal and visual cues within 7 day(s). 8.  Patient will perform their Refugio Adam in prep for ADLs within 7 days.        11/1/2021 1329 by Ro Biswas OT  Outcome: Progressing Towards Goal    OCCUPATIONAL THERAPY EVALUATION  Patient: Tramaine Gaona (62 y.o. female)  Date: 11/1/2021  Primary Diagnosis: Rapidly progressive weakness [R53.1]        Precautions:  Fall, Skin    ASSESSMENT  Based on the objective data described below, the patient presents with all over weakness LEs > UEs, impaired sensation with increased time, tone, coordination, decreased endurance, mobility, balance in sitting and standing and safety following admission for progressive weakness. Per pt she has peripheral neuropathy, but doesn't know why, progressive weakness x1 year with 1 week onset of inability to walk and 3 month decline in vision where she cannot drive. Pt requires max-total A for ADLs and total A for functional mobility. Recommend IP rehab at discharge. Current Level of Function Impacting Discharge (ADLs/self-care): max-total A for ADLs and total A for functional mobility    Functional Outcome Measure:  Unable to perform UE Fugl-Malik as transport arrived for pt to take to [place]. Other factors to consider for discharge: see above     Patient will benefit from skilled therapy intervention to address the above noted impairments. PLAN :  Recommendations and Planned Interventions: self care training, functional mobility training, therapeutic exercise, balance training, visual/perceptual training, therapeutic activities, cognitive retraining, endurance activities, neuromuscular re-education, patient education, home safety training, and family training/education    Frequency/Duration: Patient will be followed by occupational therapy 5 times a week to address goals. Recommendation for discharge: (in order for the patient to meet his/her long term goals)  Therapy 3 hours per day 5-7 days per week    This discharge recommendation:  Has not yet been discussed the attending provider and/or case management    IF patient discharges home will need the following DME: TBD       SUBJECTIVE:   Patient stated I hurt all over.     OBJECTIVE DATA SUMMARY:   HISTORY:   History reviewed. No pertinent past medical history. History reviewed. No pertinent surgical history.   Expanded or extensive additional review of patient history:     Home Situation  Home Environment: Private residence  # Steps to Enter: 4  Rails to Enter: Yes  Hand Rails : Left  One/Two Story Residence: One story  Living Alone: No  Support Systems: Parent(s), Spouse/Significant Other, Child(elizabeth) (pt lives with  and their 3 children ages 11 and 1)  Patient Expects to be Discharged to[de-identified] Rehabilitation facility  Current DME Used/Available at Home: Cane, straight  Tub or Shower Type: Tub/Shower combination    Hand dominance: Right    EXAMINATION OF PERFORMANCE DEFICITS:  Cognitive/Behavioral Status:  Neurologic State: Alert  Orientation Level: Oriented to person;Oriented to place  Cognition: Decreased attention/concentration; Follows commands  Perception: Cues to maintain midline in sitting;Cues to maintain midline in standing; Tactile;Verbal;Visual  Perseveration: No perseveration noted  Safety/Judgement: Awareness of environment;Decreased insight into deficits; Fall prevention    Hearing: Auditory  Auditory Impairment: None    Vision/Perceptual:    Acuity:  (NT- eyes closed due to light sensitivity per pt)         Range of Motion:  AROM: Generally decreased, functional (UEs 3-/5 except hands and unable to ; LEs 2- to 3-/5)  PROM: Generally decreased, functional                      Strength:  Strength: Generally decreased, functional (UEs 3-/5 except hands and unable to ; LEs 2- to 3-/5)                Coordination:  Coordination: Grossly decreased, non-functional  Fine Motor Skills-Upper: Left Impaired;Right Impaired    Gross Motor Skills-Upper: Left Impaired;Right Impaired    Tone & Sensation:  Tone: Abnormal - B hand extensor tone when ask to   Sensation: Impaired (BUEs and LEs- pt states h/o peropheral neuropathy)     Balance:  Sitting: Impaired; With support  Sitting - Static: Fair (occasional)  Sitting - Dynamic: Poor (constant support)  Standing: Impaired;Pull to stand; With support  Standing - Static: Poor;Constant support  Standing - Dynamic : Poor;Constant support    Functional Mobility and Transfers for ADLs:  Bed Mobility:  Rolling: Total assistance  Supine to Sit: Total assistance  Sit to Supine: Total assistance  Scooting:  Total assistance    Transfers:  Sit to Stand: Total assistance; Additional time;Assist x2 (pt unable to WB through BLEs- autumn lift used)  Stand to Sit: Total assistance  Bed to Chair: Total assistance (pt unable to WB through BLEs- autumn lift used)  Toilet Transfer : Total assistance; Additional time;Assist x1 (pt unable to WB through BLEs- autumn lift used)  Assistive Device :  (autumn lift)    Neuro Re-Ed and ADL Assessment:  Feeding: Maximum assistance; Additional time;Assist x1 (unable to grasp utensils)    Oral Facial Hygiene/Grooming: Maximum assistance    Bathing: Total assistance    Upper Body Dressing: Maximum assistance    Lower Body Dressing: Total assistance    Toileting: Total assistance    Cognitive Retraining  Safety/Judgement: Awareness of environment;Decreased insight into deficits; Fall prevention    Functional Measure:  Fugl-Malik Assessment of Motor Recovery after Stroke: Unable to assess as transport arrived for pt to take to CT. Barthel Index:  Bathin  Bladder: 5  Bowels: 10  Groomin  Dressin  Feedin  Mobility: 0  Stairs: 0  Toilet Use: 0  Transfer (Bed to Chair and Back): 5  Total: 20/100      The Barthel ADL Index: Guidelines  1. The index should be used as a record of what a patient does, not as a record of what a patient could do. 2. The main aim is to establish degree of independence from any help, physical or verbal, however minor and for whatever reason. 3. The need for supervision renders the patient not independent. 4. A patient's performance should be established using the best available evidence. Asking the patient, friends/relatives and nurses are the usual sources, but direct observation and common sense are also important. However direct testing is not needed. 5. Usually the patient's performance over the preceding 24-48 hours is important, but occasionally longer periods will be relevant.   6. Middle categories imply that the patient supplies over 50 per cent of the effort. 7. Use of aids to be independent is allowed. Score Interpretation (from [address] [facility] 83)    Independent   60-79 Minimally independent   40-59 Partially dependent   20-39 Very dependent   <20 Totally dependent     -Delmy Loyd., Barthel, D.W. (1965). Functional evaluation: the Barthel Index. [address] ([address] [place] [address] (1997). The Barthel activities of daily living index: self-reporting versus actual performance in the old (> or = 75 years). Journal of [address] [place] [address] 45(7), [facility], J.NADINEF, Dwain Woodson., Isaias Thorpe. (1999). Measuring the change in disability after inpatient rehabilitation; comparison of the responsiveness of the Barthel Index and Functional [place] Measure. Journal of Neurology, Neurosurgery, and Psychiatry, 66(4), 199-761. NEHA Medeiros, RAMYA Saenz, & Flavia Gill M.A. (2004) Assessment of post-stroke quality of life in cost-effectiveness studies: The usefulness of the Barthel Index and the EuroQoL-5D. Quality of Life Research, 15, 337-08        Occupational Therapy Evaluation Charge Determination   History Examination Decision-Making   LOW Complexity : Brief history review  HIGH Complexity : 5 or more performance deficits relating to physical, cognitive , or psychosocial skils that result in activity limitations and / or participation restrictions MEDIUM Complexity : Patient may present with comorbidities that affect occupational performnce.  Miniml to moderate modification of tasks or assistance (eg, physical or verbal ) with assesment(s) is necessary to enable patient to complete evaluation       Based on the above components, the patient evaluation is determined to be of the following complexity level: LOW   Pain Rating:  10/10    Activity Tolerance:   Fair and requires frequent rest breaks    After treatment patient left in no apparent distress:    Supine in bed, Call bell within reach, and Caregiver / family present    COMMUNICATION/EDUCATION:   The patients plan of care was discussed with: Physical therapist and Registered nurse. Patient was educated regarding her deficit(s) of progressive weakness and inability to amb as this relates to her diagnosis of possible neuro issue. She demonstrated Fair understanding as evidenced by awareness of impairments. Patient and/or family was verbally educated on the BE FAST acronym for signs/symptoms of CVA and TIA. All questions answered with patient indicating fair understanding. Home safety education was provided and the patient/caregiver indicated understanding., Patient/family have participated as able in goal setting and plan of care. , and Patient/family agree to work toward stated goals and plan of care. This patients plan of care is appropriate for delegation to [facility].     Thank you for this referral.  Jimenez Schulte OT  Time Calculation: 25 mins

## 2021-11-01 NOTE — PROGRESS NOTES
Spiritual Care Assessment/Progress Note  [facility]      NAME: Natasha Guido      MRN: [id number]  AGE: 28 y.o. SEX: female  Hoahaoism Affiliation: No Anabaptism   Language: English     11/1/2021     Total Time (in minutes): 5     Spiritual Assessment begun in [facility] EMERGENCY DEPT through conversation with:         [x]Patient        [] Family    [] Friend(s)        Reason for Consult: Other (comment) (Initial Spiritual Assessment - ED)     Spiritual beliefs: (Please include comment if needed)     [] Identifies with a meche tradition:         [] Supported by a meche community:            [x] Claims no spiritual orientation:           [] Seeking spiritual identity:                [] Adheres to an individual form of spirituality:           [] Not able to assess:                           Identified resources for coping:      [] Prayer                               [] Music                  [] Guided Imagery     [] Family/friends                 [] Pet visits     [] Devotional reading                         [x] Unknown     [] Other:                                              Interventions offered during this visit: (See comments for more details)    Patient Interventions: Affirmation of emotions/emotional suffering           Plan of Care:     [] Support spiritual and/or cultural needs    [] Support AMD and/or advance care planning process      [] Support grieving process   [] Coordinate Rites and/or Rituals    [] Coordination with community clergy   [x] No spiritual needs identified at this time   [] Detailed Plan of Care below (See Comments)  [] Make referral to Music Therapy  [] Make referral to Pet Therapy     [] Make referral to Addiction services  [] Make referral to [facility]  [] Make referral to Spiritual Care Partner  [] No future visits requested        [] Follow up upon further referrals     Comments: Provided pastoral support to MsJon  Johnathan Aliya while rounding in the [facility] ED. BS Intern Alexus Jones was shadowing. Rev. ZULMA Hernandez.Div, [facility] Specialist

## 2021-11-02 ENCOUNTER — APPOINTMENT (OUTPATIENT)
Dept: MRI IMAGING | Age: 31
End: 2021-11-02
Attending: PSYCHIATRY & NEUROLOGY
Payer: MEDICAID

## 2021-11-02 LAB
BACTERIA SPEC CULT: NORMAL
FOLATE SERPL-MCNC: 15.2 NG/ML (ref 5–21)
SERVICE CMNT-IMP: NORMAL
T4 FREE SERPL-MCNC: 1.2 NG/DL (ref 0.8–1.5)
VIT B12 SERPL-MCNC: 585 PG/ML (ref 193–986)

## 2021-11-02 PROCEDURE — 96375 TX/PRO/DX INJ NEW DRUG ADDON: CPT

## 2021-11-02 PROCEDURE — 99218 HC RM OBSERVATION: CPT

## 2021-11-02 PROCEDURE — 36415 COLL VENOUS BLD VENIPUNCTURE: CPT

## 2021-11-02 PROCEDURE — 97530 THERAPEUTIC ACTIVITIES: CPT | Performed by: PHYSICAL THERAPIST

## 2021-11-02 PROCEDURE — 82607 VITAMIN B-12: CPT

## 2021-11-02 PROCEDURE — 74011250637 HC RX REV CODE- 250/637: Performed by: STUDENT IN AN ORGANIZED HEALTH CARE EDUCATION/TRAINING PROGRAM

## 2021-11-02 PROCEDURE — G0378 HOSPITAL OBSERVATION PER HR: HCPCS

## 2021-11-02 PROCEDURE — 74011000258 HC RX REV CODE- 258: Performed by: PSYCHIATRY & NEUROLOGY

## 2021-11-02 PROCEDURE — 84439 ASSAY OF FREE THYROXINE: CPT

## 2021-11-02 PROCEDURE — 99205 OFFICE O/P NEW HI 60 MIN: CPT | Performed by: PSYCHIATRY & NEUROLOGY

## 2021-11-02 PROCEDURE — 96372 THER/PROPH/DIAG INJ SC/IM: CPT

## 2021-11-02 PROCEDURE — 74011250637 HC RX REV CODE- 250/637: Performed by: PSYCHIATRY & NEUROLOGY

## 2021-11-02 PROCEDURE — 74011250636 HC RX REV CODE- 250/636: Performed by: STUDENT IN AN ORGANIZED HEALTH CARE EDUCATION/TRAINING PROGRAM

## 2021-11-02 PROCEDURE — 74011250636 HC RX REV CODE- 250/636: Performed by: PSYCHIATRY & NEUROLOGY

## 2021-11-02 PROCEDURE — 70553 MRI BRAIN STEM W/O & W/DYE: CPT

## 2021-11-02 PROCEDURE — 51798 US URINE CAPACITY MEASURE: CPT

## 2021-11-02 PROCEDURE — 96376 TX/PRO/DX INJ SAME DRUG ADON: CPT

## 2021-11-02 PROCEDURE — 74011000250 HC RX REV CODE- 250: Performed by: STUDENT IN AN ORGANIZED HEALTH CARE EDUCATION/TRAINING PROGRAM

## 2021-11-02 PROCEDURE — A9576 INJ PROHANCE MULTIPACK: HCPCS | Performed by: STUDENT IN AN ORGANIZED HEALTH CARE EDUCATION/TRAINING PROGRAM

## 2021-11-02 PROCEDURE — 82746 ASSAY OF FOLIC ACID SERUM: CPT

## 2021-11-02 RX ORDER — TRAMADOL HYDROCHLORIDE 50 MG/1
50 TABLET ORAL
Status: DISCONTINUED | OUTPATIENT
Start: 2021-11-02 | End: 2021-11-05 | Stop reason: HOSPADM

## 2021-11-02 RX ORDER — GABAPENTIN 100 MG/1
100 CAPSULE ORAL 3 TIMES DAILY
Status: DISCONTINUED | OUTPATIENT
Start: 2021-11-02 | End: 2021-11-03

## 2021-11-02 RX ORDER — TIZANIDINE 4 MG/1
4 TABLET ORAL
Status: DISCONTINUED | OUTPATIENT
Start: 2021-11-02 | End: 2021-11-05 | Stop reason: HOSPADM

## 2021-11-02 RX ORDER — ONDANSETRON 4 MG/1
4 TABLET, FILM COATED ORAL
COMMUNITY

## 2021-11-02 RX ORDER — COLCHICINE 0.6 MG/1
0.6 TABLET ORAL DAILY
Status: COMPLETED | OUTPATIENT
Start: 2021-11-03 | End: 2021-11-04

## 2021-11-02 RX ORDER — DEXTROSE MONOHYDRATE AND SODIUM CHLORIDE 5; .45 G/100ML; G/100ML
100 INJECTION, SOLUTION INTRAVENOUS CONTINUOUS
Status: DISCONTINUED | OUTPATIENT
Start: 2021-11-02 | End: 2021-11-04

## 2021-11-02 RX ORDER — CEPHALEXIN 500 MG/1
500 CAPSULE ORAL EVERY 6 HOURS
Status: DISCONTINUED | OUTPATIENT
Start: 2021-11-02 | End: 2021-11-02

## 2021-11-02 RX ORDER — FOLIC ACID 1 MG/1
1 TABLET ORAL DAILY
COMMUNITY

## 2021-11-02 RX ADMIN — TRAMADOL HYDROCHLORIDE 50 MG: 50 TABLET, COATED ORAL at 18:48

## 2021-11-02 RX ADMIN — GADOTERIDOL 19 ML: 279.3 INJECTION, SOLUTION INTRAVENOUS at 15:45

## 2021-11-02 RX ADMIN — GABAPENTIN 100 MG: 100 CAPSULE ORAL at 17:08

## 2021-11-02 RX ADMIN — FOLIC ACID 1 MG: 1 TABLET ORAL at 08:40

## 2021-11-02 RX ADMIN — COLCHICINE 1.2 MG: 0.6 TABLET ORAL at 08:40

## 2021-11-02 RX ADMIN — GABAPENTIN 100 MG: 100 CAPSULE ORAL at 21:05

## 2021-11-02 RX ADMIN — CEPHALEXIN 500 MG: 250 CAPSULE ORAL at 02:06

## 2021-11-02 RX ADMIN — ENOXAPARIN SODIUM 40 MG: 100 INJECTION SUBCUTANEOUS at 08:40

## 2021-11-02 RX ADMIN — CEPHALEXIN 500 MG: 250 CAPSULE ORAL at 07:07

## 2021-11-02 RX ADMIN — ACETAMINOPHEN 650 MG: 325 TABLET ORAL at 20:46

## 2021-11-02 RX ADMIN — SODIUM CHLORIDE 250 MG: 9 INJECTION, SOLUTION INTRAVENOUS at 12:28

## 2021-11-02 RX ADMIN — SODIUM CHLORIDE 250 MG: 9 INJECTION, SOLUTION INTRAVENOUS at 20:25

## 2021-11-02 RX ADMIN — Medication 10 ML: at 14:00

## 2021-11-02 RX ADMIN — DEXTROSE AND SODIUM CHLORIDE 100 ML/HR: 5; 450 INJECTION, SOLUTION INTRAVENOUS at 18:04

## 2021-11-02 RX ADMIN — Medication 100 MG: at 08:40

## 2021-11-02 RX ADMIN — Medication 10 ML: at 21:29

## 2021-11-02 NOTE — PROGRESS NOTES
Bedside and Verbal shift change report given to *** (oncoming nurse) by *** (offgoing nurse). Report included the following information SBAR, Kardex, Procedure Summary, Intake/Output, MAR, Recent Results and Cardiac Rhythm Sinus Tach Patient resting quietly in bed with no sign of pain or distress . Crystal Davis

## 2021-11-02 NOTE — PROGRESS NOTES
Bedside report received from Amira Calvillo, [facility] (offgoing nurse) to [employer]. Report included the folliwing information Kardex, SBAR, I/O, MAR and lab results. Pt in bed on bedpan.  at bedside.

## 2021-11-02 NOTE — PROGRESS NOTES
Reason for Admission: HISTORY OF PRESENT ILLNESS:     Patricia Robles is a 28 y.o.  female with PMH of above-mentioned problems who presents to ED with c/o *lower extremity weakness and unable to take care of herself at home. RUR Score:   OBSERVATION                  Plan for utilizing home health:          PCP: First and Last name:  None  Patient has an MD in [place]. Name of Practice:    Are you a current patient: Yes/No:    Approximate date of last visit:    Can you participate in a virtual visit with your PCP:                     Current Advanced Directive/Advance Care Plan: Full Code Patient wants CPR/ventilation. Healthcare Decision Maker is her , Karoline Salamanca [phone]. Healthcare Decision Maker:   Click here to complete [address] including selection of the Healthcare Decision Maker Relationship (ie \"Primary\")                             Transition of Care Plan:                    Met with patient fur assessment. Patient was very tired after testing today. Verified address and telephone number. Lives with her  and 3 children. Just moved to [facility] from [place] about a month ago. CM talked to her about recommendation from therapy for IPR. Joseph Noe Explained that referrals sent to [facility] and [facility]. CM received a call from Viki Palmer at [facility] stating that patient has been accepted medically and they wanted to know about proceeding with getting insurance authorization. CM called patient  and discussed Freedom of Choice. He is in favor of patient going to [facility]. CM talked to patient and she stated that she is in agreement with her  as to going to IPR. Plan  1/  Assist with transition to [facility] IPR. Care Management Interventions  PCP Verified by CM:  Yes  Transition of Care Consult (CM Consult): Discharge Planning, Acute Rehab  Physical Therapy Consult: Yes  Occupational Therapy Consult: Yes  Support Systems: Spouse/Significant Other  The Plan for Transition of Care is Related to the Following Treatment Goals : Transition to IPR  The Patient and/or Patient Representative was Provided with a Choice of Provider and Agrees with the Discharge Plan?: Yes  Name of the Patient Representative Who was Provided with a Choice of Provider and Agrees with the Discharge Plan: Patient,   Freedom of Choice List was Provided with Basic Dialogue that Supports the Patient's Individualized Plan of Care/Goals, Treatment Preferences and Shares the Quality Data Associated with the Providers?: Yes  Discharge Location  Discharge Placement: Rehab hospital/unit acute     AMALIA Frausto/BILLY  [phone]

## 2021-11-02 NOTE — CONSULTS
NEUROLOGY CONSULTATION    DATE OF CONSULTATION: 11/2/2021    CONSULTED BY:Dr ZULMA Urban    REASON FOR CONSULT: Lower extremity weakness, frequent fall      HISTORY OF PRESENT ILLNESS  Shona Calabrese is a 28 y.o.  right-handed black female with history of migraine headaches, EtOH abuse, neuropathy who presented to the emergency room because of her condition was getting worse. Patient has not been able to ambulate, somewhat confused. Patient's memory is said to be bad, patient says she does not remember much. Initial head CT scan obtained at the emergency room was unremarkable, however because of patient's symptomatology she was admitted for further neurological evaluation and management. MRI of the brain was ordered without gadolinium  At time of interview and examination, patient says for the past 1 year she has been having numbness and intermittent weakness of the extremities mostly in the leg. Patient says she has neurologist and has been worked up but never had MRI done. On reviewing the chart, I was able to see that this patient had spinal tap which was unremarkable for other things but MS profile was not included in the work-up. Patient says she was told that she had some neuropathy. Blood work that was done showed macrocytosis and mild hepatic enzyme derangements which was attributed to alcohol consumption. Currently patient is complaining of pain all over the body was in the extremities, unable to move. She says she also having headache, she noted that she was diagnosed with possible migraine headache as she has been having headache for a long time. Patient says she also has been having blurry vision, episodes of visual obscuration. She says the symptoms started progressing after her last baby and now it it has gotten worse. She noted that she has been having problems with her memory. She says she is always dizzy.   She denies any obvious loss of consciousness, dysphagia or odynophagia. Review of Systems - General ROS: positive for  - fatigue and sleep disturbance  Psychological ROS: positive for - anxiety, concentration difficulties and sleep disturbances  Ophthalmic ROS: positive for - blurry vision, decreased vision, double vision and photophobia  ENT ROS: positive for - headaches, tinnitus, vertigo and visual changes  Allergy and Immunology ROS: negative  Hematological and Lymphatic ROS: negative  Endocrine ROS: negative  Respiratory ROS: no cough, shortness of breath, or wheezing  Cardiovascular ROS: no chest pain or dyspnea on exertion  Gastrointestinal ROS: no abdominal pain, change in bowel habits, or black or bloody stools  Genito-Urinary ROS: no dysuria, trouble voiding, or hematuria  Musculoskeletal ROS: positive for - joint pain, joint stiffness, muscle pain and muscular weakness  Neurological ROS: positive for - confusion, dizziness, gait disturbance, headaches, impaired coordination/balance, memory loss, numbness/tingling, visual changes and weakness  Dermatological ROS: negative          PMH  History reviewed. No pertinent past medical history. SH  Social History     Socioeconomic History    Marital status:      Spouse name: Not on file    Number of children: Not on file    Years of education: Not on file    Highest education level: Not on file   Tobacco Use    Smoking status: Never Smoker    Smokeless tobacco: Never Used   Substance and Sexual Activity    Alcohol use: Yes     Comment: occasionally    Drug use: Not Currently    Sexual activity: Yes     Partners: Male     Birth control/protection: Injection     Social Determinants of Health     Financial Resource Strain:     Difficulty of Paying Living Expenses:    Food Insecurity:     Worried About Running Out of Food in the Last Year:     [address] in the Last Year:    Transportation Needs:     Lack of Transportation (Medical):      Lack of Transportation (Non-Medical):    Physical Activity:     Days of Exercise per Week:     Minutes of Exercise per Session:    Stress:     Feeling of Stress :    Social Connections:     Frequency of Communication with Friends and Family:     Frequency of Social Gatherings with Friends and Family:     Attends [employer] Services:     Active Member of Clubs or Organizations:     Attends Club or Organization Meetings:     Marital Status:        Bennett Patiño  History reviewed. No pertinent family history.     ALLERGIES  Allergies   Allergen Reactions    Aspirin Swelling     Throat swelling       CURRENT MEDS  Current Facility-Administered Medications   Medication Dose Route Frequency Provider Last Rate Last Admin    [START ON 11/3/2021] colchicine tablet 0.6 mg  0.6 mg Oral DAILY Nithin Holguin MD        methylPREDNISolone (PF) (Solu-MEDROL) injection 250 mg  250 mg IntraVENous Q6H Uri Bishop MD        sodium chloride (NS) flush 5-40 mL  5-40 mL IntraVENous Q8H Cuate Gonzalez MD   10 mL at 11/01/21 2200    sodium chloride (NS) flush 5-40 mL  5-40 mL IntraVENous PRN Sushant Guerra MD        acetaminophen (TYLENOL) tablet 650 mg  650 mg Oral Q6H PRN Sushant Guerra MD   650 mg at 11/01/21 1901    Or    acetaminophen (TYLENOL) suppository 650 mg  650 mg Rectal Q6H PRN Sushant Guerra MD        polyethylene glycol (MIRALAX) packet 17 g  17 g Oral DAILY PRN Sushant Guerra MD        ondansetron (ZOFRAN ODT) tablet 4 mg  4 mg Oral Q8H PRN Sushant Guerra MD        Or    ondansetron [facility]) injection 4 mg  4 mg IntraVENous Q6H PRN Sushant Guerra MD        enoxaparin (LOVENOX) injection 40 mg  40 mg SubCUTAneous DAILY Sushant Guerra MD   40 mg at 11/02/21 0840    thiamine HCL (B-1) tablet 100 mg  100 mg Oral DAILY Sushant Guerra MD   100 mg at 73/14/08 3776    folic acid (FOLVITE) tablet 1 mg  1 mg Oral DAILY Sushant Guerra MD   1 mg at 11/02/21 0840       ROS  As per HPI  CLINICAL DATA REVIEW  IMAGING: (I personally reviewed these images in PACS )        LABS  Recent Results (from the past 48 hour(s))   CBC WITH AUTOMATED DIFF    Collection Time: 10/31/21 11:09 PM   Result Value Ref Range    WBC 8.3 3.6 - 11.0 K/uL    RBC 4.16 3.80 - 5.20 M/uL    HGB 14.2 11.5 - 16.0 g/dL    HCT 41.7 35.0 - 47.0 %    .2 (H) 80.0 - 99.0 FL    MCH 34.1 (H) 26.0 - 34.0 PG    MCHC 34.1 30.0 - 36.5 g/dL    RDW 16.4 (H) 11.5 - 14.5 %    PLATELET 207 980 - 106 K/uL    MPV 10.5 8.9 - 12.9 FL    NRBC 7.1 (H) 0  WBC    ABSOLUTE NRBC 0.59 (H) 0.00 - 0.01 K/uL    NEUTROPHILS 57 32 - 75 %    LYMPHOCYTES 33 12 - 49 %    MONOCYTES 5 5 - 13 %    EOSINOPHILS 0 0 - 7 %    BASOPHILS 1 0 - 1 %    IMMATURE GRANULOCYTES 4 (H) 0.0 - 0.5 %    ABS. NEUTROPHILS 4.8 1.8 - 8.0 K/UL    ABS. LYMPHOCYTES 2.7 0.8 - 3.5 K/UL    ABS. MONOCYTES 0.4 0.0 - 1.0 K/UL    ABS. EOSINOPHILS 0.0 0.0 - 0.4 K/UL    ABS. BASOPHILS 0.1 0.0 - 0.1 K/UL    ABS. IMM. GRANS. 0.3 (H) 0.00 - 0.04 K/UL    DF SMEAR SCANNED      RBC COMMENTS ANISOCYTOSIS  1+       AMMONIA    Collection Time: 11/01/21 12:38 AM   Result Value Ref Range    Ammonia 20 <32 UMOL/L   BETA HCG, QT    Collection Time: 11/01/21 12:38 AM   Result Value Ref Range    Beta HCG, QT <1 0 - 6 MIU/ML   ETHYL ALCOHOL    Collection Time: 11/01/21 12:38 AM   Result Value Ref Range    ALCOHOL(ETHYL),SERUM <10 <65 MG/DL   METABOLIC PANEL, COMPREHENSIVE    Collection Time: 11/01/21 12:38 AM   Result Value Ref Range    Sodium 134 (L) 136 - 145 mmol/L    Potassium 4.4 3.5 - 5.1 mmol/L    Chloride 100 97 - 108 mmol/L    CO2 18 (L) 21 - 32 mmol/L    Anion gap 16 (H) 5 - 15 mmol/L    Glucose 95 65 - 100 mg/dL    BUN 42 (H) 6 - 20 MG/DL    Creatinine 0.86 0.55 - 1.02 MG/DL    BUN/Creatinine ratio 49 (H) 12 - 20      GFR est AA >60 >60 ml/min/1.73m2    GFR est non-AA >60 >60 ml/min/1.73m2    Calcium 8.2 (L) 8.5 - 10.1 MG/DL    Bilirubin, total 1.7 (H) 0.2 - 1.0 MG/DL    ALT (SGPT) 23 12 - 78 U/L    AST (SGOT) 43 (H) 15 - 37 U/L    Alk.  phosphatase 70 45 - 117 U/L    Protein, total 5.8 (L) 6.4 - 8.2 g/dL    Albumin 2.6 (L) 3.5 - 5.0 g/dL    Globulin 3.2 2.0 - 4.0 g/dL    A-G Ratio 0.8 (L) 1.1 - 2.2     D DIMER    Collection Time: 11/01/21 12:38 AM   Result Value Ref Range    D-dimer 3.48 (H) 0.00 - 0.65 mg/L FEU   MAGNESIUM    Collection Time: 11/01/21 12:38 AM   Result Value Ref Range    Magnesium 2.1 1.6 - 2.4 mg/dL   TSH 3RD GENERATION    Collection Time: 11/01/21 12:38 AM   Result Value Ref Range    TSH 4.00 (H) 0.36 - 3.74 uIU/mL   URINALYSIS W/ RFLX MICROSCOPIC    Collection Time: 11/01/21  5:09 AM   Result Value Ref Range    Color ORANGE      Appearance CLEAR CLEAR      Specific gravity >1.030 (H) 1.003 - 1.030    pH (UA) 6.0 5.0 - 8.0      Protein Negative NEG mg/dL    Glucose Negative NEG mg/dL    Ketone 15 (A) NEG mg/dL    Blood Negative NEG      Urobilinogen 1.0 0.2 - 1.0 EU/dL    Nitrites Positive (A) NEG      Leukocyte Esterase TRACE (A) NEG     DRUG SCREEN, URINE    Collection Time: 11/01/21  5:09 AM   Result Value Ref Range    AMPHETAMINES Negative NEG      BARBITURATES Negative NEG      BENZODIAZEPINES Negative NEG      COCAINE Negative NEG      METHADONE Negative NEG      OPIATES Negative NEG      PCP(PHENCYCLIDINE) Negative NEG      THC (TH-CANNABINOL) Negative NEG      Drug screen comment (NOTE)    BILIRUBIN, CONFIRM    Collection Time: 11/01/21  5:09 AM   Result Value Ref Range    Bilirubin UA, confirm Positive (A) NEG     URINE MICROSCOPIC ONLY    Collection Time: 11/01/21  5:09 AM   Result Value Ref Range    WBC 0-4 0 - 4 /hpf    RBC 0-5 0 - 5 /hpf    Epithelial cells FEW FEW /lpf    Bacteria 1+ (A) NEG /hpf   CULTURE, URINE    Collection Time: 11/01/21  6:00 AM    Specimen: Clean catch; Urine   Result Value Ref Range    Special Requests: NO SPECIAL REQUESTS      Culture result: No growth (<1,000 CFU/ML)     URIC ACID    Collection Time: 11/01/21 10:29 PM   Result Value Ref Range    Uric acid 8.8 (H) 2.6 - 6.0 MG/DL        PHYSICAL EXAM  Visit Vitals  /68 (BP 1 Location: Left lower arm)   Pulse (!) 117   Temp 97.8 °F (36.6 °C)   Resp 18   Ht 5' 3\" (1.6 m)   Wt 219 lb (99.3 kg)   SpO2 100%   BMI 38.79 kg/m²     General:  Alert, cooperative, no distress. Head:  Normocephalic, without obvious abnormality, atraumatic. Eyes:  Conjunctivae/corneas clear. Pupils equal, round, reactive to light. Extraocular movements intact, VFF, NO papilledema   Lungs:  Heart:   Non labored breathing  Regular rate and rhythm, no carotid bruits   Abdomen:   Soft, non-distended   Extremities: Extremities normal, atraumatic, no cyanosis or edema. Diffuse tenderness   Pulses: 2+ and symmetric all extremities. Skin: Skin color, texture, turgor normal. No rashes or lesions. Neurologic:  Gen: Attention normal             Language: naming, repetition, fluency normal             Memory: intact recent and remote memory  Cranial Nerves:  I: smell Not tested   II: visual fields Full to confrontation   II: pupils Equal, round, reactive to light   II: optic disc No papilledema   III,VII: ptosis none   III,IV,VI: extraocular muscles  Full ROM   V: mastication normal   V: facial light touch sensation  normal   VII: facial muscle function   symmetric   VIII: hearing symmetric   IX: soft palate elevation  normal   XI: trapezius strength  5/5   XI: sternocleidomastoid strength 5/5   XI: neck flexion strength  5/5   XII: tongue  midline     Motor: normal bulk and tone, no tremor              Strength: 1/5 lower extremity, 4-/5 bilateral upper extremity Sensory: Decreased sensation to LT, PP, vibration, and temperature bilateral lower extremity up to the knee. Due to diffuse tenderness, examination was limited  Coordination: FTN and HTS abnormal  Gait: Not assessed  Reflexes: 1+ throughout  Plantar: Withdrawn       IMPRESSION: This is a 62-year-old right-handed black female who was admitted with progression of symptoms, diffuse tenderness, difficulty ambulating.   MRI of the brain without gadolinium reviewed showed abnormal FLAIR signal intensity in the white matter involving corpus callosum particularly splenium. This is consistent with demyelinating disease-multiple sclerosis. Multiple sclerosis  Neuropathy  Gait disorder  Myofascial spasm  Migraine headaches. RECOMMENDATIONS:  Solu-Medrol 250 mg every 6 hourly for 3 days IV  Neurontin 100 mg p.o. 3 times daily  Zanaflex 4 mg p.o. nightly  Percocet 5/325 p.o. as needed for severe pain  MRI of the brain with and without gadolinium  MRI of the cervical spine with and without gadolinium  PT/OT evaluation and management, patient will need intensive physical therapy  Fall precaution  Blood for ACE titer, CK, aldolase. VTE prophylaxis: Lovenox. Thank you very much for this consultation.        Eddie Hassan MD

## 2021-11-02 NOTE — PROGRESS NOTES
[facility]: MED RECONCILIATION - ADDENDUM    Comments/Recommendations: This is a follow-up from 11/1/21. Please see pharmacist note from that date for details. Unable to complete medication interview with patient   states he has prescriptions from [facility] for vitamin B (likely thiamine), folic acid, and ondansetron that he has not filled yet  Per , patient stopped taking vitamin B because she states it made her stomach hurt    Medications added:   Folic acid  ondansetron    Information obtained from: Patient's  via telephone    Prior to Admission Medications:   Prior to Admission Medications   Prescriptions Last Dose Informant Patient Reported? Taking? DULoxetine (CYMBALTA) 30 mg capsule  Other Yes Yes   Sig: Take 30 mg by mouth daily. folic acid (FOLVITE) 1 mg tablet  Significant Other Yes Yes   Sig: Take 1 mg by mouth daily. gabapentin (NEURONTIN) 100 mg capsule  Significant Other Yes Yes   Sig: Take 200 mg by mouth four (4) times daily. ondansetron hcl (Zofran) 4 mg tablet  Significant Other Yes Yes   Sig: Take 4 mg by mouth every eight (8) hours as needed for Nausea or Vomiting.         Thank you,  Genia Finnegan, PharmD, BCPS  [phone]

## 2021-11-02 NOTE — PROGRESS NOTES
1745: Pt transferred from [facility] to [address] [place] [address]. Report from Irma Morton 694. Continuous tele, O2 and BP monitoring initiated. Upon transfer, MEWS 4 r/t tachycardia. 1800: Fluids started @100. Pt complains of 9/10 pain in massimo arms and legs at this time. 1845: Pt complaining of 8/10 pain in massimo UE and LE. Dr. Sunny Allred note referenced Norco PRN which was not ordered. Notified Dr Abdirizak Oliva r/t pain. Tramadol ordered. 1850: Tramadol given. HR to 130 while pt was lying in bed and attempting to use her cell phone with one hand.

## 2021-11-02 NOTE — CONSULTS
PSYCHIATRY CONSULT NOTE:    REASON FOR CONSULT:  delusions  substance abuse    HISTORY OF PRESENTING COMPLAINT:  North Guerrero is a 28 y.o. female  with PMH of above-mentioned problems who presents to ED with c/o *lower extremity weakness and unable to take care of herself at home. Patient states that she has been diagnosed with neuropathy 4 years ago and she has been declining for past 1-1/2-year. Patient is wheelchair-bound and is taken care of by her  at home with 3 kids. Patient today denied any active chest pain belly pain difficulty breathing or shortness of breath. Patient endorsed pain in left foot. Patient stated that she used to drink alcohol every single day and used to have alcohol withdrawal when she would remain abstinent from alcohol. But after her first born she has cut back and now she drinks few days a week. Last drink was yesterday. Patient  was not able to take care of her at home at this time. Patient was seen this afternoon and was clam and cooperative. Denies depression anxiety and no SI/HI/AH/VH. She states that she is pregnant so be careful with any medications we choose for her condition. Complains of pain all over from a neuropathy. Notes it began when she fell down some stairs and hurt her back. She was recovering when she tried lifting her 3 yo son and things went downhill from there as she put it. No aggression or violence. Alcohol as per above. No recent concerns for this. Staff reports that she has mentioned being pregnant to different people and was explained to her that she is not pregnant by many. She does not seem to recall these conversations and did not realized she was pregnant until someone told her (by her account to this interviewer). She seems to have some memory dysfunction as she can not recall information given by nurses repetitively over the past day or two. Pleasant and cooperative however.     PAST PSYCHIATRIC HISTORY:  Drug/alcohol by history. No psychiatric admissions    SUBSTANCE ABUSE HISTORY:  Social History     Substance and Sexual Activity   Drug Use Not Currently     Social History     Substance and Sexual Activity   Alcohol Use Yes    Comment: occasionally     Social History     Tobacco Use   Smoking Status Never Smoker   Smokeless Tobacco Never Used       PAST MEDICAL HISTORY:  History reviewed. No pertinent past medical history. SOCIAL HISTORY:   has 3 kids. .   Denies drugs, Tobacco.  (+) ETOH    VITALS:  Visit Vitals  /81 (BP 1 Location: Left lower arm)   Pulse (!) 133   Temp 99 °F (37.2 °C)   Resp 20   Ht 5' 3\" (1.6 m)   Wt 98.6 kg (217 lb 6 oz)   SpO2 100%   BMI 38.51 kg/m²       MEDICATIONS:    Current Facility-Administered Medications:     [START ON 11/3/2021] colchicine tablet 0.6 mg, 0.6 mg, Oral, DAILY, Cuate Gonzalez MD    methylPREDNISolone (PF) (Solu-MEDROL) 250 mg in 0.9% sodium chloride 100 mL IVPB, 250 mg, IntraVENous, Q6H, Uri Bishop MD, Last Rate: 200 mL/hr at 11/02/21 1228, 250 mg at 11/02/21 1228    sodium chloride (NS) flush 5-40 mL, 5-40 mL, IntraVENous, Q8H, Cuate Gonzalez MD, 10 mL at 11/01/21 2200    sodium chloride (NS) flush 5-40 mL, 5-40 mL, IntraVENous, PRN, Gentry Schlatter, MD    acetaminophen (TYLENOL) tablet 650 mg, 650 mg, Oral, Q6H PRN, 650 mg at 11/01/21 1901 **OR** acetaminophen (TYLENOL) suppository 650 mg, 650 mg, Rectal, Q6H PRN, Gentry Schlatter, MD    polyethylene glycol (MIRALAX) packet 17 g, 17 g, Oral, DAILY PRN, Adrienne Barlow MD    ondansetron (ZOFRAN ODT) tablet 4 mg, 4 mg, Oral, Q8H PRN **OR** ondansetron (ZOFRAN) injection 4 mg, 4 mg, IntraVENous, Q6H PRN, Cuate Barlow MD    enoxaparin (LOVENOX) injection 40 mg, 40 mg, SubCUTAneous, DAILY, Cuate Barlow MD, 40 mg at 11/02/21 0840    thiamine HCL (B-1) tablet 100 mg, 100 mg, Oral, DAILY, Osbaldo ROJAS MD, 100 mg at 96/55/65 1100    folic acid (Emely Trenton) tablet 1 mg, 1 mg, Oral, DAILY, Rush Bey MD, 1 mg at 11/02/21 0840    MENTAL STATUS EXAM:  Calm  Person/place only  Alert, denies SI/HI/AH/VH. No aggression or violence  Goal directed and Aware of her surroundings location but forgetful about situation    ASSESSMENT AND PLAN:  Delirium multifactorial, Mild cognitive impairment vs dementia, Deferred , Problems with access to health care services and Other psychosocial or environmental problems  and 51-60 moderate symptoms    RECOMMENDATION:  Not a candidate for inpatient psychiatry  Unlikely pseudocyesis as she does not display signs or symptoms of pregnancy. Seems to be more memory centered. Agree with neurology consult as her neuropathy and memory seem to be her main issues.   Thank you for this consultation    Debbie Jay MD  11/2/2021

## 2021-11-02 NOTE — PROGRESS NOTES
Problem: Pressure Injury - Risk of  Goal: *Prevention of pressure injury  Description: Document José Miguel Scale and appropriate interventions in the flowsheet. Outcome: Progressing Towards Goal  Note: Pressure Injury Interventions:  Sensory Interventions: Assess need for specialty bed, Check visual cues for pain, Discuss PT/OT consult with provider    Moisture Interventions: Check for incontinence Q2 hours and as needed    Activity Interventions: Assess need for specialty bed, PT/OT evaluation    Mobility Interventions: Assess need for specialty bed, PT/OT evaluation    Nutrition Interventions: Discuss nutritional consult with provider    Friction and Shear Interventions: Minimize layers                Problem: Falls - Risk of  Goal: *Absence of Falls  Description: Document Arturo Uribe Fall Risk and appropriate interventions in the flowsheet.   Outcome: Progressing Towards Goal  Note: Fall Risk Interventions:       Mentation Interventions: Bed/chair exit alarm, Door open when patient unattended    Medication Interventions: Evaluate medications/consider consulting pharmacy    Elimination Interventions: Bed/chair exit alarm, Toileting schedule/hourly rounds    History of Falls Interventions: Bed/chair exit alarm, Consult care management for discharge planning, Door open when patient unattended         Problem: Patient Education: Go to Patient Education Activity  Goal: Patient/Family Education  Outcome: Progressing Towards Goal

## 2021-11-02 NOTE — PROGRESS NOTES
Problem: Patient Education: Go to Patient Education Activity  Goal: Patient/Family Education  11/2/2021 1526 by Christ Huddleston RN  Outcome: Progressing Towards Goal  11/2/2021 1202 by Christ Huddleston RN  Outcome: Progressing Towards Goal     Problem: Falls - Risk of  Goal: *Absence of Falls  Description: Document Damaris Minium Fall Risk and appropriate interventions in the flowsheet. 11/2/2021 1526 by Christ Huddleston RN  Outcome: Progressing Towards Goal  Note: Fall Risk Interventions:       Mentation Interventions: Bed/chair exit alarm    Medication Interventions: Bed/chair exit alarm    Elimination Interventions: Bed/chair exit alarm    History of Falls Interventions: Bed/chair exit alarm      11/2/2021 1202 by Christ Huddleston RN  Outcome: Progressing Towards Goal  Note: Fall Risk Interventions:       Mentation Interventions: Bed/chair exit alarm    Medication Interventions: Bed/chair exit alarm    Elimination Interventions: Bed/chair exit alarm    History of Falls Interventions: Bed/chair exit alarm         Problem: Patient Education: Go to Patient Education Activity  Goal: Patient/Family Education  11/2/2021 1526 by Christ Huddleston RN  Outcome: Progressing Towards Goal  11/2/2021 1202 by Christ Huddleston RN  Outcome: Progressing Towards Goal     Problem: Patient Education: Go to Patient Education Activity  Goal: Patient/Family Education  11/2/2021 1526 by Christ Huddleston RN  Outcome: Progressing Towards Goal  11/2/2021 1202 by Christ Huddleston RN  Outcome: Progressing Towards Goal     Problem: Pressure Injury - Risk of  Goal: *Prevention of pressure injury  Description: Document José Miguel Scale and appropriate interventions in the flowsheet.   11/2/2021 1526 by Christ Huddleston RN  Outcome: Progressing Towards Goal  Note: Pressure Injury Interventions:  Sensory Interventions: Assess changes in LOC    Moisture Interventions: Check for incontinence Q2 hours and as needed    Activity Interventions: Assess need for specialty bed    Mobility Interventions: Assess need for specialty bed    Nutrition Interventions: Offer support with meals,snacks and hydration    Friction and Shear Interventions: Minimize layers             11/2/2021 1202 by Sammy Lugo RN  Outcome: Progressing Towards Goal  Note: Pressure Injury Interventions:  Sensory Interventions: Assess changes in LOC    Moisture Interventions: Check for incontinence Q2 hours and as needed    Activity Interventions: Assess need for specialty bed    Mobility Interventions: Assess need for specialty bed    Nutrition Interventions: Offer support with meals,snacks and hydration    Friction and Shear Interventions: Minimize layers                Problem: Patient Education: Go to Patient Education Activity  Goal: Patient/Family Education  11/2/2021 1526 by Sammy Lugo RN  Outcome: Progressing Towards Goal  11/2/2021 1202 by Sammy Lugo RN  Outcome: Progressing Towards Goal

## 2021-11-02 NOTE — PROGRESS NOTES
CINTHIA  RUR   LOS 2 days  Patient in here under OBSERVATION status. 1000  IDT met to discuss plan of care. Psych consult today. Alcohol induced peripheral neuropathy. Chronic lower extremity weakness. OT/PT have evaluated and they are recommending IPR. Foot is less swollen, medication seems to be helping. [address] [facility] called [facility] [phone] to check on status of referral sent via [phone]. Nohemy Richardson is the admissions coordinator. Message left for a return call.      AMALIA Neal/BILLY  [phone]

## 2021-11-02 NOTE — PROGRESS NOTES
Problem: Patient Education: Go to Patient Education Activity  Goal: Patient/Family Education  Outcome: Progressing Towards Goal     Problem: Falls - Risk of  Goal: *Absence of Falls  Description: Document Antoinettetracy Lizzette Fall Risk and appropriate interventions in the flowsheet. Outcome: Progressing Towards Goal  Note: Fall Risk Interventions:       Mentation Interventions: Bed/chair exit alarm    Medication Interventions: Bed/chair exit alarm    Elimination Interventions: Bed/chair exit alarm    History of Falls Interventions: Bed/chair exit alarm         Problem: Patient Education: Go to Patient Education Activity  Goal: Patient/Family Education  Outcome: Progressing Towards Goal     Problem: Patient Education: Go to Patient Education Activity  Goal: Patient/Family Education  Outcome: Progressing Towards Goal     Problem: Pressure Injury - Risk of  Goal: *Prevention of pressure injury  Description: Document José Miguel Scale and appropriate interventions in the flowsheet.   Outcome: Progressing Towards Goal  Note: Pressure Injury Interventions:  Sensory Interventions: Assess changes in LOC    Moisture Interventions: Check for incontinence Q2 hours and as needed    Activity Interventions: Assess need for specialty bed    Mobility Interventions: Assess need for specialty bed    Nutrition Interventions: Offer support with meals,snacks and hydration    Friction and Shear Interventions: Minimize layers                Problem: Patient Education: Go to Patient Education Activity  Goal: Patient/Family Education  Outcome: Progressing Towards Goal

## 2021-11-02 NOTE — PROGRESS NOTES
Pt off bedpan. States she couldn't go. Skin check w/ large red bruise of L knee and healing scrap of L arm. Pt denied falling but  states she did fall a week ago. She thinks she is a [facility], did not know the day and went threw the presidents [id number] when asked who the president was. She believe that she eat but  states she hasn't eaten much since the morning. Very [de-identified]. Water given, able to swallow w/o issue. Prefers to lie flat and unable to  with either hand. Bilat. hand strength very weak. Speaks with eyes closed. Pupils 2mm. No complaints of pain but tingling in lower extremities. Bilat feet/ankle red and +1-2 non pitting edema. Pt has 3 lil children at home. 2yo and 3yo twins (girl/boy). Pt's  will stay til visiting hours are over.

## 2021-11-02 NOTE — PROGRESS NOTES
Bedside and Verbal shift change report given to Lucina RN (oncoming nurse) by Emani Or RN (offgoing nurse). Report included the following information SBAR, Kardex and MAR.

## 2021-11-02 NOTE — ACP (ADVANCE CARE PLANNING)
Current Advanced Directive/Advance Care Plan: Full Code Patient wants CPR/ventilation. Healthcare Decision Maker is her , Gabrielle Cochran [phone].

## 2021-11-02 NOTE — PROGRESS NOTES
Problem: Mobility Impaired (Adult and Pediatric)  Goal: *Acute Goals and Plan of Care (Insert Text)  Description: FUNCTIONAL STATUS PRIOR TO ADMISSION: Patient was modified independent using a single point cane for functional mobility. Driving up until a few months ago when her vision got blurry, In the last week weakness became progress to the point where she was mobilizing in a wheelchair    [facility]: The patient lived with  and 3 young children. Physical Therapy Goals  Initiated 11/1/2021  1. Patient will move from supine to sit and sit to supine  in bed with moderate assistance  within 7 day(s). 2.  Patient will transfer from bed to chair and chair to bed with moderate assistance  using the least restrictive device within 7 day(s). 3.  Patient will perform sit to stand with moderate assistance  within 7 day(s). 4.  Patient will sit EOB with CGA for 10min within 7 days     Outcome: Progressing Towards Goal   PHYSICAL THERAPY TREATMENT  Patient: Dulce Ragland (33 y.o. female)  Date: 11/2/2021  Diagnosis: Rapidly progressive weakness [R53.1] <principal problem not specified>       Precautions: Fall, Skin  Chart, physical therapy assessment, plan of care and goals were reviewed. ASSESSMENT  Patient continues with skilled PT services and is progressing towards goals. Patient with significant pain and weakness in all extremities causing difficulty with all mobility tasks. Patient continues be require total assist for supine to sit and sit to supine transfer, but was able to maintain static sitting balance with min-modA. Patient tolerated weight baring through elbow BlL to assist with sit to supine transfer. Patient able to bridge for scooting with stabilization of LEs.     Current Level of Function Impacting Discharge (mobility/balance): total assist    Other factors to consider for discharge: Neuro workup         PLAN :  Patient continues to benefit from skilled intervention to address the above impairments. Continue treatment per established plan of care. to address goals. Recommendation for discharge: (in order for the patient to meet his/her long term goals)  Therapy 3 hours per day 5-7 days per week    This discharge recommendation:  Has been made in collaboration with the attending provider and/or case management    IF patient discharges home will need the following DME: hospital bed and mechanical lift       SUBJECTIVE:   Patient stated My hands and feet really hut. I can feel my baby kicking.     OBJECTIVE DATA SUMMARY:   Critical Behavior:  Neurologic State: Eyes open to voice, Lethargic  Orientation Level: Oriented to person  Cognition: Impaired decision making, Memory loss  Safety/Judgement: Awareness of environment, Decreased insight into deficits, Fall prevention  Functional Mobility Training:  Bed Mobility:  Rolling: Total assistance  Supine to Sit: Total assistance  Sit to Supine: Total assistance  Scooting: Total assistance  Balance:  Sitting: Impaired; With support  Sitting - Static: Fair (occasional)  Sitting - Dynamic: Poor (constant support)    Pain Ratin/10 static, burning.  RN aware    Activity Tolerance:   Fair and requires rest breaks    After treatment patient left in no apparent distress:   Supine in bed, Call bell within reach, Bed / chair alarm activated and seizure pads    COMMUNICATION/COLLABORATION:   The patients plan of care was discussed with: Physical therapist.     Duarte Johnson, PT   Time Calculation: 18 mins

## 2021-11-02 NOTE — PROGRESS NOTES
Hospitalist Progress Note    NAME: Tramaine Gaona   :  1990   MRN:  [id number]   Room Number:  605/44  @ [facility]       Interim Hospital Summary: 28 y.o. female whom presented on 10/31/2021 with      Assessment / Plan:      #peripheral neuropathy ? ETOH induced   #Chronic lower extremity weakness  #Metabolic encephalopathy unknown whether acute or chronic  -CT head negative for acute process  -[facility] [address] [facility]  - LP done 3/1/2021: WBC 2 protein 24.3 glucose 63  -VDRL CSF nonreactive nonreactive, herpes 1 and 2 PCR CSF negative  -HIV neg  2021  -Hepatitis B and C test  Neg 2020  -Double-stranded DNA negative, centromere antibody negative, SSA/  SS- B antibody negative, Aurora 1 antibody neg- 2021  -Scleroderma 70 - 2.9 - 21   -MRI : Abnormal FLAIR signal intensity predominantly in the splenium of the corpus  callosum with mild associated diffusion restriction. No intracranial mass hemorrhage  -Neurology and psychiatry consulted  -B12 585 folate 15  -  RPR pending   - free T4 1.2   -PT OT recommend acute rehab        #Abnormal UA   -UA with bacteria, leukoesterase and positive nitrites  -Urine culture less than 1000      # Sinus tachycardia ? Poor PO intake   - Free T4 1.2   - EKG w/ sinus tachycardia   - maintenance IVF          # Macrocytosis suspect secondary to alcohol use  -  -B12 667-  3/56/5419  -Thiamine/ folic acid     #Left foot swelling 2/2 gout attack  - Xray w/o Fracture and Duplex neg fr DVT   - uric acid 8.8   -Colchicine 1.8 mg day 1 and 0.6 mg x 3 days   -Allopurinol once acute flare is over     #Alcohol use disorder  -Patient states she was a daily drinker until her first born child and then she drinks couple of times a week  -Patient history of alcohol withdrawal in the past  -CIWA protocol without Ativan  -Thiamine folic acid     Body mass index is 38.79 kg/m².   Code Status: Full   Surrogate Decision Maker:     DVT Prophylaxis: Lovenox  GI Prophylaxis: not indicated               Subjective:     Chief Complaint / Reason for Physician Visit  Is drowsy discussed with RN events overnight. Review of Systems:  No fevers, chills, appetite change, cough, sputum production, shortness of breath, dyspnea on exertion, nausea, vomitting, diarrhea, constipation, chest pain, leg edema, abdominal pain, joint pain, rash, itching. Tolerating PT/OT. Tolerating diet. Objective:     VITALS:   Last 24hrs VS reviewed since prior progress note. Most recent are:  Patient Vitals for the past 24 hrs:   Temp Pulse Resp BP SpO2   11/02/21 0749 97.8 °F (36.6 °C) (!) 117 18 126/68 100 %   11/02/21 0400  (!) 116 16 126/80    11/02/21 0000  (!) 122 16 122/80    11/01/21 2000  (!) 121 16     11/01/21 1955 97 °F (36.1 °C) 90 18 126/80 98 %   11/01/21 1605  (!) 119      11/01/21 1407 98.2 °F (36.8 °C) (!) 121 18 127/77 100 %   11/01/21 1300 98.1 °F (36.7 °C) (!) 127 25 (!) 132/99    11/01/21 1145  (!) 112 18 125/85 100 %     No intake or output data in the 24 hours ending 11/02/21 0913     PHYSICAL EXAM:  General: WD, WN. Sleepy  no acute distress    EENT:  EOMI. Anicteric sclerae. MMM  Resp:  CTA bilaterally, no wheezing or rales. No accessory muscle use  CV:  Regular  rhythm,  normal S1/S2, no murmurs rubs gallops, No edema  GI:  Soft, Non distended, Non tender. +Bowel sounds  Neurologic:  Alert and oriented X 1, normal speech,   Psych:   . Not anxious nor agitated  Skin:  No rashes.   No jaundice left  to touch    Reviewed most current lab test results and cultures  YES  Reviewed most current radiology test results   YES  Review and summation of old records today    NO  Reviewed patient's current orders and MAR    YES  PMH/SH reviewed - no change compared to H&P  ________________________________________________________________________  Care Plan discussed with:    Comments Patient x    Family      RN x    Care Manager x    Consultant  x Neurology and psychiatry                     x Multidiciplinary team rounds were held today with , nursing, pharmacist and clinical coordinator. Patient's plan of care was discussed; medications were reviewed and discharge planning was addressed. ________________________________________________________________________  Total NON critical care TIME:  25  Minutes    Total CRITICAL CARE TIME Spent:   Minutes non procedure based      Comments   >50% of visit spent in counseling and coordination of care x    ________________________________________________________________________  Dorian Pollard MD     Procedures: see electronic medical records for all procedures/Xrays and details which were not copied into this note but were reviewed prior to creation of Plan. LABS:  I reviewed today's most current labs and imaging studies.   Pertinent labs include:  Recent Labs     10/31/21  2309   WBC 8.3   HGB 14.2   HCT 41.7        Recent Labs     11/01/21  0038   *   K 4.4      CO2 18*   GLU 95   BUN 42*   CREA 0.86   CA 8.2*   MG 2.1   ALB 2.6*   TBILI 1.7*   ALT 23       Signed: Dorian Pollard MD

## 2021-11-03 LAB
CK SERPL-CCNC: 100 U/L (ref 26–192)
ERYTHROCYTE [SEDIMENTATION RATE] IN BLOOD: 31 MM/HR (ref 0–20)
HCYS SERPL-SCNC: 23.4 UMOL/L (ref 3.7–13.9)

## 2021-11-03 PROCEDURE — 74011250636 HC RX REV CODE- 250/636: Performed by: PSYCHIATRY & NEUROLOGY

## 2021-11-03 PROCEDURE — 74011250636 HC RX REV CODE- 250/636: Performed by: STUDENT IN AN ORGANIZED HEALTH CARE EDUCATION/TRAINING PROGRAM

## 2021-11-03 PROCEDURE — 74011000250 HC RX REV CODE- 250: Performed by: STUDENT IN AN ORGANIZED HEALTH CARE EDUCATION/TRAINING PROGRAM

## 2021-11-03 PROCEDURE — 74011250637 HC RX REV CODE- 250/637: Performed by: STUDENT IN AN ORGANIZED HEALTH CARE EDUCATION/TRAINING PROGRAM

## 2021-11-03 PROCEDURE — 36415 COLL VENOUS BLD VENIPUNCTURE: CPT

## 2021-11-03 PROCEDURE — 83090 ASSAY OF HOMOCYSTEINE: CPT

## 2021-11-03 PROCEDURE — 82085 ASSAY OF ALDOLASE: CPT

## 2021-11-03 PROCEDURE — 74011250637 HC RX REV CODE- 250/637: Performed by: PSYCHIATRY & NEUROLOGY

## 2021-11-03 PROCEDURE — G0378 HOSPITAL OBSERVATION PER HR: HCPCS

## 2021-11-03 PROCEDURE — 96372 THER/PROPH/DIAG INJ SC/IM: CPT

## 2021-11-03 PROCEDURE — 82550 ASSAY OF CK (CPK): CPT

## 2021-11-03 PROCEDURE — 74011000258 HC RX REV CODE- 258: Performed by: PSYCHIATRY & NEUROLOGY

## 2021-11-03 PROCEDURE — 86235 NUCLEAR ANTIGEN ANTIBODY: CPT

## 2021-11-03 PROCEDURE — 85652 RBC SED RATE AUTOMATED: CPT

## 2021-11-03 PROCEDURE — 82164 ANGIOTENSIN I ENZYME TEST: CPT

## 2021-11-03 PROCEDURE — 86038 ANTINUCLEAR ANTIBODIES: CPT

## 2021-11-03 PROCEDURE — 84155 ASSAY OF PROTEIN SERUM: CPT

## 2021-11-03 PROCEDURE — 96376 TX/PRO/DX INJ SAME DRUG ADON: CPT

## 2021-11-03 RX ORDER — GABAPENTIN 100 MG/1
200 CAPSULE ORAL 4 TIMES DAILY
Status: DISCONTINUED | OUTPATIENT
Start: 2021-11-03 | End: 2021-11-05 | Stop reason: HOSPADM

## 2021-11-03 RX ORDER — GABAPENTIN 100 MG/1
200 CAPSULE ORAL 3 TIMES DAILY
Status: DISCONTINUED | OUTPATIENT
Start: 2021-11-03 | End: 2021-11-03

## 2021-11-03 RX ORDER — DULOXETIN HYDROCHLORIDE 30 MG/1
30 CAPSULE, DELAYED RELEASE ORAL DAILY
Status: DISCONTINUED | OUTPATIENT
Start: 2021-11-03 | End: 2021-11-05 | Stop reason: HOSPADM

## 2021-11-03 RX ORDER — METOPROLOL TARTRATE 25 MG/1
12.5 TABLET, FILM COATED ORAL 2 TIMES DAILY
Status: DISCONTINUED | OUTPATIENT
Start: 2021-11-03 | End: 2021-11-05 | Stop reason: HOSPADM

## 2021-11-03 RX ORDER — NALOXONE HYDROCHLORIDE 0.4 MG/ML
0.4 INJECTION, SOLUTION INTRAMUSCULAR; INTRAVENOUS; SUBCUTANEOUS
Status: DISCONTINUED | OUTPATIENT
Start: 2021-11-03 | End: 2021-11-05 | Stop reason: HOSPADM

## 2021-11-03 RX ADMIN — GABAPENTIN 200 MG: 100 CAPSULE ORAL at 17:34

## 2021-11-03 RX ADMIN — GABAPENTIN 200 MG: 100 CAPSULE ORAL at 21:05

## 2021-11-03 RX ADMIN — SODIUM CHLORIDE 250 MG: 9 INJECTION, SOLUTION INTRAVENOUS at 08:29

## 2021-11-03 RX ADMIN — ENOXAPARIN SODIUM 40 MG: 100 INJECTION SUBCUTANEOUS at 08:30

## 2021-11-03 RX ADMIN — METOPROLOL TARTRATE 12.5 MG: 25 TABLET, FILM COATED ORAL at 10:26

## 2021-11-03 RX ADMIN — TRAMADOL HYDROCHLORIDE 50 MG: 50 TABLET, COATED ORAL at 21:05

## 2021-11-03 RX ADMIN — Medication 10 ML: at 14:00

## 2021-11-03 RX ADMIN — SODIUM CHLORIDE 250 MG: 9 INJECTION, SOLUTION INTRAVENOUS at 02:00

## 2021-11-03 RX ADMIN — COLCHICINE 0.6 MG: 0.6 TABLET ORAL at 08:30

## 2021-11-03 RX ADMIN — Medication 100 MG: at 08:30

## 2021-11-03 RX ADMIN — FOLIC ACID 1 MG: 1 TABLET ORAL at 08:30

## 2021-11-03 RX ADMIN — DEXTROSE AND SODIUM CHLORIDE 100 ML/HR: 5; 450 INJECTION, SOLUTION INTRAVENOUS at 04:34

## 2021-11-03 RX ADMIN — SODIUM CHLORIDE 250 MG: 9 INJECTION, SOLUTION INTRAVENOUS at 14:09

## 2021-11-03 RX ADMIN — GABAPENTIN 100 MG: 100 CAPSULE ORAL at 08:30

## 2021-11-03 RX ADMIN — GABAPENTIN 200 MG: 100 CAPSULE ORAL at 12:15

## 2021-11-03 RX ADMIN — SODIUM CHLORIDE 250 MG: 9 INJECTION, SOLUTION INTRAVENOUS at 20:43

## 2021-11-03 RX ADMIN — DEXTROSE AND SODIUM CHLORIDE 100 ML/HR: 5; 450 INJECTION, SOLUTION INTRAVENOUS at 18:36

## 2021-11-03 RX ADMIN — DULOXETINE HYDROCHLORIDE 30 MG: 30 CAPSULE, DELAYED RELEASE ORAL at 12:15

## 2021-11-03 RX ADMIN — METOPROLOL TARTRATE 12.5 MG: 25 TABLET, FILM COATED ORAL at 17:34

## 2021-11-03 RX ADMIN — Medication 10 ML: at 20:43

## 2021-11-03 NOTE — PROGRESS NOTES
Hospitalist Progress Note    NAME: Shona Calabrese   :  1990   MRN:  [id number]   Room Number:  MUG3/05  @ [address] [place] [address] Road Summary: 28 y.o. female whom presented on 10/31/2021 with      Assessment / Plan:  Anticipated discharge date : 2021  Anticipated disposition : Acute rehab  Barriers to discharge : completion of steroids, placement      Peripheral neuropathy ? ETOH induced   Chronic lower extremity weakness  Metabolic encephalopathy unknown whether acute or chronic  CT head negative for acute process. Extensive work[facility] done at [facility] unremarkable. LP done 3/1/2021: WBC 2 protein 24.3 glucose 63. VDRL CSF nonreactive nonreactive, herpes 1 and 2 PCR CSF negative. HIV neg  2021. Hepatitis B and C test  Neg 2020. Double-stranded DNA negative, centromere antibody negative, SSA/  SS- B antibody negative, Aurora 1 antibody neg- 2021. Scleroderma 70 - 2.9 - 21. MRI : Abnormal FLAIR signal intensity predominantly in the splenium of the corpus callosum with mild associated diffusion restriction. No intracranial mass hemorrhage.  B12 585 folate 15. free T4 1.2     -Neurology consulted - started on pulse dose steroids  -PT OT recommend acute rehab          Sinus tachycardia,chronic POA  - Free T4 1.2   - EKG w/ sinus tachycardia     - start low dose metoprolol           Macrocytosis suspect secondary to alcohol use  -  -B12 667-    -Thiamine/ folic acid     Left foot swelling 2/2 gout attack  - Xray w/o Fracture and Duplex neg fr DVT   - uric acid 8.8   -Colchicine 1.8 mg day 1 and 0.6 mg x 3 days   -Allopurinol once acute flare is over     Alcohol use disorder  -Patient states she was a daily drinker until her first born child and then she drinks couple of times a week  -Patient history of alcohol withdrawal in the past  -CIWA protocol without Ativan  -Thiamine folic acid      Abnormal UA   -UA with bacteria, leukoesterase and positive nitrites  -Urine culture less than 1000          Body mass index is 38.79 kg/m². Code Status: Full   Surrogate Decision Maker:     DVT Prophylaxis: Lovenox  GI Prophylaxis: not indicated              Subjective:     Chief Complaint / Reason for Physician Visit  \"doing ok\". Discussed with RN events overnight. Review of Systems:  No fevers, chills, appetite change, cough, sputum production, shortness of breath, dyspnea on exertion, nausea, vomitting, diarrhea, constipation, chest pain, leg edema, abdominal pain, joint pain, rash, itching. Tolerating PT/OT. Tolerating diet. Objective:     VITALS:   Last 24hrs VS reviewed since prior progress note. Most recent are:  Patient Vitals for the past 24 hrs:   Temp Pulse Resp BP SpO2   11/03/21 0823 98.2 °F (36.8 °C) (!) 114 20 (!) 124/105 100 %   11/03/21 0400  (!) 115      11/03/21 0230 98.1 °F (36.7 °C) (!) 116 24 118/80 100 %   11/03/21 0000  (!) 120      11/02/21 2209  (!) 122 18 124/70 100 %   11/02/21 2000  (!) 126      11/02/21 1900 99.7 °F (37.6 °C) (!) 124 20 124/72 100 %   11/02/21 1801 99.2 °F (37.3 °C) (!) 123 25 133/66 100 %   11/02/21 1800  (!) 123 23 133/66 90 %   11/02/21 1610 98.3 °F (36.8 °C) (!) 117 18 106/70 99 %   11/02/21 1146 99 °F (37.2 °C) (!) 133 20 125/81 100 %       Intake/Output Summary (Last 24 hours) at 11/3/2021 0651  Last data filed at 11/2/2021 1801  Gross per 24 hour   Intake 100 ml   Output    Net 100 ml        PHYSICAL EXAM:  General: Alert, cooperative, no acute distress    EENT:  EOMI. Anicteric sclerae. MMM  Resp:  CTA bilaterally, no wheezing or rales. No accessory muscle use  CV:  Regular  rhythm,  normal S1/S2, no murmurs rubs gallops, No edema  GI:  Soft, Non distended, Non tender. +Bowel sounds  Neurologic:  Alert and oriented X 3, normal speech,   Psych:   Good insight. Not anxious nor agitated  Skin:  No rashes.   No jaundice    Reviewed most current lab test results and cultures  YES  Reviewed most current radiology test results   YES  Review and summation of old records today    NO  Reviewed patient's current orders and MAR    YES  PMH/SH reviewed - no change compared to H&P  ________________________________________________________________________  Care Plan discussed with:    Comments   Patient x    Family      RN x    Care Manager x    Consultant                       x Multidiciplinary team rounds were held today with , nursing, pharmacist and clinical coordinator. Patient's plan of care was discussed; medications were reviewed and discharge planning was addressed. ________________________________________________________________________  Total NON critical care TIME:  35  Minutes    Total CRITICAL CARE TIME Spent:   Minutes non procedure based      Comments   >50% of visit spent in counseling and coordination of care     ________________________________________________________________________  Karen Peoples MD     Procedures: see electronic medical records for all procedures/Xrays and details which were not copied into this note but were reviewed prior to creation of Plan. LABS:  I reviewed today's most current labs and imaging studies.   Pertinent labs include:  Recent Labs     10/31/21  2309   WBC 8.3   HGB 14.2   HCT 41.7        Recent Labs     11/01/21  0038   *   K 4.4      CO2 18*   GLU 95   BUN 42*   CREA 0.86   CA 8.2*   MG 2.1   ALB 2.6*   TBILI 1.7*   ALT 23       Signed: Karen Peoples MD

## 2021-11-03 NOTE — PROGRESS NOTES
The documentation for this period is being entered following the guidelines as defined in the [address] downtime policy by Brien Davis RN.  From 1-330am

## 2021-11-03 NOTE — PROGRESS NOTES
Physical Therapy:    Chart reviewed and appreciated. Patient received in bed eating lunch with assistance. Visit deferred to allow patient to eat. PT will continue to follow case and attempt follow up as able.      Melania Little, PT

## 2021-11-03 NOTE — PROGRESS NOTES
Problem: Patient Education: Go to Patient Education Activity  Goal: Patient/Family Education  Outcome: Progressing Towards Goal     Problem: Falls - Risk of  Goal: *Absence of Falls  Description: Document Irisanette Montoya Fall Risk and appropriate interventions in the flowsheet. Outcome: Progressing Towards Goal  Note: Fall Risk Interventions:       Mentation Interventions: Bed/chair exit alarm    Medication Interventions: Bed/chair exit alarm    Elimination Interventions: Bed/chair exit alarm, Call light in reach    History of Falls Interventions: Bed/chair exit alarm         Problem: Patient Education: Go to Patient Education Activity  Goal: Patient/Family Education  Outcome: Progressing Towards Goal     Problem: Patient Education: Go to Patient Education Activity  Goal: Patient/Family Education  Outcome: Not Progressing Towards Goal     Problem: Pressure Injury - Risk of  Goal: *Prevention of pressure injury  Description: Document José Miguel Scale and appropriate interventions in the flowsheet.   Outcome: Progressing Towards Goal  Note: Pressure Injury Interventions:  Sensory Interventions: Assess need for specialty bed    Moisture Interventions: Check for incontinence Q2 hours and as needed    Activity Interventions: Assess need for specialty bed, PT/OT evaluation    Mobility Interventions: Assess need for specialty bed, PT/OT evaluation    Nutrition Interventions: Offer support with meals,snacks and hydration    Friction and Shear Interventions: Minimize layers                Problem: Patient Education: Go to Patient Education Activity  Goal: Patient/Family Education  Outcome: Progressing Towards Goal

## 2021-11-03 NOTE — PROGRESS NOTES
Arabella Pencil to RN change report given to Eunice RN (oncoming nurse) by [facility] RN (offgoing nurse). Report included the following information SBAR, Kardex and MAR. Pt is somewhat confused. Dinner tray untouched but states she ate \"way to [employer]"; also that she was getting up to use the BR. Last PT note 11/1 - pt is WC bound. Unable to wiggle toes or push down on feet. Advised her she can not get up at this time. ? Emanuel German.

## 2021-11-03 NOTE — PROGRESS NOTES
Assisted pt with occluded IV site. New IV site initiated by Ana purdy. While assisting, the pt began talking about her  family members were in the hallway. No family members present, pt believes she sees a young boy with dread-locks. She is hallucinating. Dr Mandel Just made aware, no new orders.

## 2021-11-03 NOTE — PROGRESS NOTES
Verbal RN to RN change report given to Simon RN (oncoming nurse) by Carl Vogt RN (offgoing nurse). Report included the following information SBAR, Kardex and MAR. Pt moved from 209a to u2 d/t cont. Tachy    Pt had med bottles in bag.  Put in  bag #[id number]

## 2021-11-03 NOTE — PROGRESS NOTES
Problem: Pressure Injury - Risk of  Goal: *Prevention of pressure injury  Description: Document José Miguel Scale and appropriate interventions in the flowsheet.   Outcome: Progressing Towards Goal  Note: Pressure Injury Interventions:  Sensory Interventions: Assess need for specialty bed    Moisture Interventions: Check for incontinence Q2 hours and as needed    Activity Interventions: Assess need for specialty bed, PT/OT evaluation    Mobility Interventions: Assess need for specialty bed, PT/OT evaluation    Nutrition Interventions: Offer support with meals,snacks and hydration    Friction and Shear Interventions: Minimize layers                Problem: Patient Education: Go to Patient Education Activity  Goal: Patient/Family Education  Outcome: Progressing Towards Goal

## 2021-11-03 NOTE — PROGRESS NOTES
CINTHIA  RUR   LOS 3d  ELOS- Anticipate d/c on Friday, 11/5    1000  IDT met to discuss plan of care. Patient c/o hands hurting. Moved to PCU yesterday evening r/t increase heart rate. Patient has been accepted to [facility] pending insurance authorization. Discharge date is Friday, 11/4.    1430  BILLY called My Miller at [facility] [phone]. She stated that they have not received insurance approval as of yet but do not anticipate and problems. BILLY told Ms. Eddy that we are expecting patient to be discharged on Friday, 11/4. She will contact us when authorization is approved.     AMALIA Aguirre/BILLY  [phone]

## 2021-11-03 NOTE — PROGRESS NOTES
Cardiac Monitoring on 11.2.21  from [phone]    Tele Strip on 11.2.21 at 1900 - Sinus Tach              Tele Strip on 11.3.21 at 0300 - Sinus Tach

## 2021-11-04 ENCOUNTER — APPOINTMENT (OUTPATIENT)
Dept: MRI IMAGING | Age: 31
End: 2021-11-04
Attending: PSYCHIATRY & NEUROLOGY
Payer: MEDICAID

## 2021-11-04 LAB
ACE SERPL-CCNC: 52 U/L (ref 14–82)
ALBUMIN SERPL ELPH-MCNC: 2.8 G/DL (ref 2.9–4.4)
ALBUMIN SERPL-MCNC: 2.5 G/DL (ref 3.5–5)
ALBUMIN/GLOB SERPL: 0.8 {RATIO} (ref 1.1–2.2)
ALBUMIN/GLOB SERPL: 1 {RATIO} (ref 0.7–1.7)
ALP SERPL-CCNC: 64 U/L (ref 45–117)
ALPHA1 GLOB SERPL ELPH-MCNC: 0.3 G/DL (ref 0–0.4)
ALPHA2 GLOB SERPL ELPH-MCNC: 0.5 G/DL (ref 0.4–1)
ALT SERPL-CCNC: 34 U/L (ref 12–78)
ANA SER QL: POSITIVE
ANION GAP SERPL CALC-SCNC: 10 MMOL/L (ref 5–15)
AST SERPL-CCNC: 37 U/L (ref 15–37)
B-GLOBULIN SERPL ELPH-MCNC: 1 G/DL (ref 0.7–1.3)
BASOPHILS # BLD: 0 K/UL (ref 0–0.1)
BASOPHILS NFR BLD: 0 % (ref 0–1)
BILIRUB DIRECT SERPL-MCNC: 0.4 MG/DL (ref 0–0.2)
BILIRUB SERPL-MCNC: 1.1 MG/DL (ref 0.2–1)
BUN SERPL-MCNC: 13 MG/DL (ref 6–20)
BUN/CREAT SERPL: 22 (ref 12–20)
CALCIUM SERPL-MCNC: 8.1 MG/DL (ref 8.5–10.1)
CENTROMERE B AB SER-ACNC: <0.2 AI (ref 0–0.9)
CHLORIDE SERPL-SCNC: 104 MMOL/L (ref 97–108)
CHROMATIN AB SERPL-ACNC: <0.2 AI (ref 0–0.9)
CO2 SERPL-SCNC: 23 MMOL/L (ref 21–32)
CREAT SERPL-MCNC: 0.6 MG/DL (ref 0.55–1.02)
DIFFERENTIAL METHOD BLD: ABNORMAL
DSDNA AB SER-ACNC: <1 IU/ML (ref 0–9)
ENA JO1 AB SER-ACNC: <0.2 AI (ref 0–0.9)
ENA RNP AB SER-ACNC: <0.2 AI (ref 0–0.9)
ENA SCL70 AB SER-ACNC: 1.2 AI (ref 0–0.9)
ENA SM AB SER-ACNC: <0.2 AI (ref 0–0.9)
ENA SS-A AB SER-ACNC: <0.2 AI (ref 0–0.9)
ENA SS-B AB SER-ACNC: <0.2 AI (ref 0–0.9)
EOSINOPHIL # BLD: 0 K/UL (ref 0–0.4)
EOSINOPHIL NFR BLD: 0 % (ref 0–7)
ERYTHROCYTE [DISTWIDTH] IN BLOOD BY AUTOMATED COUNT: 15.7 % (ref 11.5–14.5)
GAMMA GLOB SERPL ELPH-MCNC: 0.9 G/DL (ref 0.4–1.8)
GLOBULIN SER CALC-MCNC: 2.7 G/DL (ref 2.2–3.9)
GLOBULIN SER CALC-MCNC: 3.1 G/DL (ref 2–4)
GLUCOSE SERPL-MCNC: 179 MG/DL (ref 65–100)
HCT VFR BLD AUTO: 26.6 % (ref 35–47)
HGB BLD-MCNC: 8.8 G/DL (ref 11.5–16)
IMM GRANULOCYTES # BLD AUTO: 0.1 K/UL (ref 0–0.04)
IMM GRANULOCYTES NFR BLD AUTO: 1 % (ref 0–0.5)
LYMPHOCYTES # BLD: 0.7 K/UL (ref 0.8–3.5)
LYMPHOCYTES NFR BLD: 13 % (ref 12–49)
M PROTEIN SERPL ELPH-MCNC: ABNORMAL G/DL
MAGNESIUM SERPL-MCNC: 2 MG/DL (ref 1.6–2.4)
MCH RBC QN AUTO: 33 PG (ref 26–34)
MCHC RBC AUTO-ENTMCNC: 33.1 G/DL (ref 30–36.5)
MCV RBC AUTO: 99.6 FL (ref 80–99)
MONOCYTES # BLD: 0.2 K/UL (ref 0–1)
MONOCYTES NFR BLD: 4 % (ref 5–13)
NEUTS SEG # BLD: 4.4 K/UL (ref 1.8–8)
NEUTS SEG NFR BLD: 82 % (ref 32–75)
NRBC # BLD: 0.27 K/UL (ref 0–0.01)
NRBC BLD-RTO: 5 PER 100 WBC
PHOSPHATE SERPL-MCNC: 2.8 MG/DL (ref 2.6–4.7)
PLATELET # BLD AUTO: 294 K/UL (ref 150–400)
PMV BLD AUTO: 10.1 FL (ref 8.9–12.9)
POTASSIUM SERPL-SCNC: 3.3 MMOL/L (ref 3.5–5.1)
PROT SERPL-MCNC: 5.5 G/DL (ref 6–8.5)
PROT SERPL-MCNC: 5.6 G/DL (ref 6.4–8.2)
RBC # BLD AUTO: 2.67 M/UL (ref 3.8–5.2)
RBC MORPH BLD: ABNORMAL
SEE BELOW, 164869: ABNORMAL
SODIUM SERPL-SCNC: 137 MMOL/L (ref 136–145)
WBC # BLD AUTO: 5.4 K/UL (ref 3.6–11)

## 2021-11-04 PROCEDURE — 74011250637 HC RX REV CODE- 250/637: Performed by: INTERNAL MEDICINE

## 2021-11-04 PROCEDURE — A9576 INJ PROHANCE MULTIPACK: HCPCS | Performed by: STUDENT IN AN ORGANIZED HEALTH CARE EDUCATION/TRAINING PROGRAM

## 2021-11-04 PROCEDURE — 74011250636 HC RX REV CODE- 250/636: Performed by: STUDENT IN AN ORGANIZED HEALTH CARE EDUCATION/TRAINING PROGRAM

## 2021-11-04 PROCEDURE — 83735 ASSAY OF MAGNESIUM: CPT

## 2021-11-04 PROCEDURE — 96376 TX/PRO/DX INJ SAME DRUG ADON: CPT

## 2021-11-04 PROCEDURE — 97535 SELF CARE MNGMENT TRAINING: CPT

## 2021-11-04 PROCEDURE — 74011000258 HC RX REV CODE- 258: Performed by: STUDENT IN AN ORGANIZED HEALTH CARE EDUCATION/TRAINING PROGRAM

## 2021-11-04 PROCEDURE — 84100 ASSAY OF PHOSPHORUS: CPT

## 2021-11-04 PROCEDURE — 99214 OFFICE O/P EST MOD 30 MIN: CPT | Performed by: PSYCHIATRY & NEUROLOGY

## 2021-11-04 PROCEDURE — 74011000258 HC RX REV CODE- 258: Performed by: PSYCHIATRY & NEUROLOGY

## 2021-11-04 PROCEDURE — G0378 HOSPITAL OBSERVATION PER HR: HCPCS

## 2021-11-04 PROCEDURE — 97530 THERAPEUTIC ACTIVITIES: CPT

## 2021-11-04 PROCEDURE — 97530 THERAPEUTIC ACTIVITIES: CPT | Performed by: PHYSICAL THERAPIST

## 2021-11-04 PROCEDURE — 36415 COLL VENOUS BLD VENIPUNCTURE: CPT

## 2021-11-04 PROCEDURE — 97110 THERAPEUTIC EXERCISES: CPT

## 2021-11-04 PROCEDURE — 80076 HEPATIC FUNCTION PANEL: CPT

## 2021-11-04 PROCEDURE — 74011250637 HC RX REV CODE- 250/637: Performed by: STUDENT IN AN ORGANIZED HEALTH CARE EDUCATION/TRAINING PROGRAM

## 2021-11-04 PROCEDURE — 72156 MRI NECK SPINE W/O & W/DYE: CPT

## 2021-11-04 PROCEDURE — 74011000250 HC RX REV CODE- 250: Performed by: STUDENT IN AN ORGANIZED HEALTH CARE EDUCATION/TRAINING PROGRAM

## 2021-11-04 PROCEDURE — 85025 COMPLETE CBC W/AUTO DIFF WBC: CPT

## 2021-11-04 PROCEDURE — 80048 BASIC METABOLIC PNL TOTAL CA: CPT

## 2021-11-04 PROCEDURE — 96372 THER/PROPH/DIAG INJ SC/IM: CPT

## 2021-11-04 PROCEDURE — 74011250636 HC RX REV CODE- 250/636: Performed by: PSYCHIATRY & NEUROLOGY

## 2021-11-04 PROCEDURE — 96375 TX/PRO/DX INJ NEW DRUG ADDON: CPT

## 2021-11-04 RX ORDER — LANOLIN ALCOHOL/MO/W.PET/CERES
3 CREAM (GRAM) TOPICAL
Status: DISCONTINUED | OUTPATIENT
Start: 2021-11-04 | End: 2021-11-05 | Stop reason: HOSPADM

## 2021-11-04 RX ORDER — LORAZEPAM 2 MG/ML
1 INJECTION INTRAMUSCULAR ONCE
Status: COMPLETED | OUTPATIENT
Start: 2021-11-04 | End: 2021-11-04

## 2021-11-04 RX ORDER — ALLOPURINOL 100 MG/1
100 TABLET ORAL DAILY
Status: DISCONTINUED | OUTPATIENT
Start: 2021-11-04 | End: 2021-11-05 | Stop reason: HOSPADM

## 2021-11-04 RX ADMIN — FOLIC ACID 1 MG: 1 TABLET ORAL at 08:59

## 2021-11-04 RX ADMIN — TRAMADOL HYDROCHLORIDE 50 MG: 50 TABLET, COATED ORAL at 22:40

## 2021-11-04 RX ADMIN — COLCHICINE 0.6 MG: 0.6 TABLET ORAL at 08:59

## 2021-11-04 RX ADMIN — GABAPENTIN 200 MG: 100 CAPSULE ORAL at 22:40

## 2021-11-04 RX ADMIN — SODIUM CHLORIDE 500 MG: 900 INJECTION INTRAVENOUS at 18:08

## 2021-11-04 RX ADMIN — GADOTERIDOL 20 ML: 279.3 INJECTION, SOLUTION INTRAVENOUS at 17:02

## 2021-11-04 RX ADMIN — GABAPENTIN 200 MG: 100 CAPSULE ORAL at 12:19

## 2021-11-04 RX ADMIN — POTASSIUM BICARBONATE 50 MEQ: 977.5 TABLET, EFFERVESCENT ORAL at 11:19

## 2021-11-04 RX ADMIN — Medication 3 MG: at 22:40

## 2021-11-04 RX ADMIN — ALLOPURINOL 100 MG: 100 TABLET ORAL at 11:19

## 2021-11-04 RX ADMIN — Medication 3 MG: at 03:03

## 2021-11-04 RX ADMIN — Medication 10 ML: at 22:41

## 2021-11-04 RX ADMIN — GABAPENTIN 200 MG: 100 CAPSULE ORAL at 18:07

## 2021-11-04 RX ADMIN — SODIUM CHLORIDE 250 MG: 9 INJECTION, SOLUTION INTRAVENOUS at 09:04

## 2021-11-04 RX ADMIN — TRAMADOL HYDROCHLORIDE 50 MG: 50 TABLET, COATED ORAL at 11:37

## 2021-11-04 RX ADMIN — SODIUM CHLORIDE 250 MG: 9 INJECTION, SOLUTION INTRAVENOUS at 03:03

## 2021-11-04 RX ADMIN — DULOXETINE HYDROCHLORIDE 30 MG: 30 CAPSULE, DELAYED RELEASE ORAL at 09:00

## 2021-11-04 RX ADMIN — Medication 100 MG: at 08:59

## 2021-11-04 RX ADMIN — GABAPENTIN 200 MG: 100 CAPSULE ORAL at 09:00

## 2021-11-04 RX ADMIN — DEXTROSE AND SODIUM CHLORIDE 100 ML/HR: 5; 450 INJECTION, SOLUTION INTRAVENOUS at 05:39

## 2021-11-04 RX ADMIN — Medication 10 ML: at 14:00

## 2021-11-04 RX ADMIN — METOPROLOL TARTRATE 12.5 MG: 25 TABLET, FILM COATED ORAL at 18:07

## 2021-11-04 RX ADMIN — LORAZEPAM 1 MG: 2 INJECTION INTRAMUSCULAR; INTRAVENOUS at 16:49

## 2021-11-04 RX ADMIN — Medication 10 ML: at 18:09

## 2021-11-04 RX ADMIN — METOPROLOL TARTRATE 12.5 MG: 25 TABLET, FILM COATED ORAL at 08:59

## 2021-11-04 RX ADMIN — ENOXAPARIN SODIUM 40 MG: 100 INJECTION SUBCUTANEOUS at 09:00

## 2021-11-04 NOTE — PROGRESS NOTES
Problem: Self Care Deficits Care Plan (Adult)  Goal: *Acute Goals and Plan of Care (Insert Text)  Description: FUNCTIONAL STATUS PRIOR TO ADMISSION: Patient was modified independent using a single point cane for functional mobility up until 1 week ago. Pt's mother bough her a w/c 1 week ago, because she couldn't walk. Pt with progressive decreased vision x3 months and unable to drive. Pt's  states pt sees a neurologist in [place]. HOME SUPPORT: The patient lived with  but did not require assist until 1 week ago. Occupational Therapy Goals  Initiated 11/1/2021   1. Patient will perform self-feeding with minimal assistance/contact guard assist within 7 day(s). 2.  Patient will perform grooming with minimal assistance/contact guard assist within 7 day(s). 3.  Patient will perform upper body dressing with minimal assistance/contact guard assist within 7 day(s). 4.  Patient will perform toilet transfers with moderate assistance x2 to drop arm BSC within 7 day(s). 5.  Patient will perform all aspects of toileting with maximal assistance within 7 day(s). 6.  Patient will participate in upper extremity therapeutic exercise/activities with minimal assistance/contact guard assist within 7 day(s). 7.  Patient will utilize energy conservation techniques during functional activities with verbal and visual cues within 7 day(s). 8.  Patient will perform their [place] in prep for ADLs within 7 days. Outcome: Progressing Towards Goal   OCCUPATIONAL THERAPY TREATMENT  Patient: [place] (89 y.o. female)  Date: 11/4/2021  Diagnosis: Rapidly progressive weakness [R53.1]   <principal problem not specified>       Precautions: Fall, Skin  Chart, occupational therapy assessment, plan of care, and goals were reviewed. ASSESSMENT  Patient continues with skilled OT services and is progressing towards goals.  Patient received resting in bed, A&O x1, believing self to be in a [place] hot. Reoriented patient to situation, however patient perseverative about being left behind in a hotel room when  called approx 3 minutes later. Patient continues to exhibit signs of delusion and hallucinations, requiring frequent reorientation and redirection. Upon arrival, patient reporting being soiled and stating  went to go get supplies to help her clean up ( not present in hospital), also noted to repeatedly state [facility]\" with empty hand in position appropriate for holding phone. Patient participating in bed level rolling to L / R to facilitate increased independence in toileting, initiating bending LEs upon request but unable to coordinate UE without physical assist and multimodal cues. Total assist required for rolling and hygiene at this time. Patient agreeable to fine motor exercises prior to arrival of transport. Continue to recommend IPR at discharge as patient is well below her independent baseline at this time. Current Level of Function Impacting Discharge (ADLs): total assist    Other factors to consider for discharge: independent at baseline         PLAN :  Patient continues to benefit from skilled intervention to address the above impairments. Continue treatment per established plan of care to address goals. Recommend with staff: frequent reorientation, frequent repositioning and hygiene for skin integrity    Recommendation for discharge: (in order for the patient to meet his/her long term goals)  Therapy 3 hours per day 5-7 days per week    This discharge recommendation:  Has been made in collaboration with the attending provider and/or case management    IF patient discharges home will need the following DME: hospital bed, mechanical lift and wheelchair       SUBJECTIVE:   Patient stated Why am I the only one still at the hotel? Why'd you guys leave? \" to spouse on phone following therapist reorientation 3 minutes prior.     OBJECTIVE DATA SUMMARY: Cognitive/Behavioral Status:  Neurologic State: Alert  Orientation Level: Oriented to person; Disoriented to place; Disoriented to situation; Oriented to time (to year only)  Cognition: Decreased command following; Decreased attention/concentration; Impaired decision making  Perception: Verbal; Visual; Tactile  Perseveration: No perseveration noted  Safety/Judgement: Awareness of environment; Decreased insight into deficits    Functional Mobility and Transfers for ADLs:  Bed Mobility:  Rolling: Total assistance    Balance:  Sitting: Impaired; With support  Sitting - Static: Supported sitting  ADL Intervention:   Toileting   Toileting Assistance: Total assistance  Clothing Management: Total assistance    Cognitive Retraining  Orientation Retraining: Reorienting; Place; Situation; Time; Awareness of environment  Problem Solving: Awareness of environment; Identifying the task  Executive Functions: Executing cognitive plans  Organizing/Sequencing: Breaking task down  Attention to Task: Single task  Safety/Judgement: Awareness of environment; Decreased insight into deficits    Therapeutic Exercise & Neuro Re-Education:  -Gross grasp flexion / extension of digits I-V, x5 R and L  -Wrist extension, x3 R and L within limited range  -Opposition of digit I to I-IV, x2 R and L, within significant time and cues    -Soft resistance foam squeeze, x5 with fair tolerance within limited range    Pain:  Patient reporting pain in B hands (described as burning / tingling). RN in room and aware. Activity Tolerance:   Fair and requires rest breaks    After treatment patient left in no apparent distress:   Supine in bed, Call bell within reach, Bed / chair alarm activated and Side rails x 3    COMMUNICATION/COLLABORATION:   The patients plan of care was discussed with: Physical therapist and Registered nurse.      Venus Stout OT  Time Calculation: 25 mins

## 2021-11-04 NOTE — PROGRESS NOTES
Problem: Patient Education: Go to Patient Education Activity  Goal: Patient/Family Education  Outcome: Progressing Towards Goal     Problem: Falls - Risk of  Goal: *Absence of Falls  Description: Document Terence Curiel Fall Risk and appropriate interventions in the flowsheet. Outcome: Progressing Towards Goal  Note: Fall Risk Interventions:       Mentation Interventions: Adequate sleep, hydration, pain control    Medication Interventions: Bed/chair exit alarm    Elimination Interventions: Call light in reach, Bed/chair exit alarm    History of Falls Interventions: Bed/chair exit alarm         Problem: Patient Education: Go to Patient Education Activity  Goal: Patient/Family Education  Outcome: Progressing Towards Goal     Problem: Patient Education: Go to Patient Education Activity  Goal: Patient/Family Education  Outcome: Progressing Towards Goal     Problem: Pressure Injury - Risk of  Goal: *Prevention of pressure injury  Description: Document José Miguel Scale and appropriate interventions in the flowsheet.   Outcome: Progressing Towards Goal  Note: Pressure Injury Interventions:  Sensory Interventions: Assess need for specialty bed    Moisture Interventions: Check for incontinence Q2 hours and as needed    Activity Interventions: Chair cushion    Mobility Interventions: Pressure redistribution bed/mattress (bed type)    Nutrition Interventions: Offer support with meals,snacks and hydration    Friction and Shear Interventions: Minimize layers                Problem: Patient Education: Go to Patient Education Activity  Goal: Patient/Family Education  Outcome: Progressing Towards Goal     Problem: Patient Education: Go to Patient Education Activity  Goal: Patient/Family Education  Outcome: Progressing Towards Goal

## 2021-11-04 NOTE — PROGRESS NOTES
Bedside and Verbal shift change report given to Lucina RN (oncoming nurse) by Diana Osler RN (offgoing nurse). Report included the following information SBAR, Kardex and MAR.

## 2021-11-04 NOTE — PROGRESS NOTES
Hospitalist Progress Note    NAME: Howard Garza   :  1990   MRN:  [id number]   Room Number:  515/82  @ [facility]       Interim Hospital Summary: 28 y.o. female whom presented on 10/31/2021 with      Assessment / Plan:    Anticipated discharge date : 2021  Anticipated disposition : Acute rehab  Barriers to discharge : completion of steroids, placement        Peripheral neuropathy ? ETOH induced   Chronic lower extremity weakness  Metabolic encephalopathy unknown whether acute or chronic  CT head negative for acute process. Extensive work-up done at [facility]-mostly unremarkable. LP done 3/1/2021: WBC 2 protein 24.3 glucose 63. VDRL CSF nonreactive nonreactive, herpes 1 and 2 PCR CSF negative. HIV neg  2021. Hepatitis B and C test  Neg 2020. Double-stranded DNA negative, centromere antibody negative, SSA/  SS- B antibody negative, Aurora 1 antibody neg- 2021. Scleroderma 70 - 2.9 - 21. MRI : Abnormal FLAIR signal intensity predominantly in the splenium of the corpus callosum with mild associated diffusion restriction. No intracranial mass hemorrhage. B12 585 folate 15. free T4 1.2      -Neurology consulted - started on pulse dose steroids  -PT OT recommend acute rehab           Sinus tachycardia,chronic POA  - Free T4 1.2   - EKG w/ sinus tachycardia      - start low dose metoprolol     Delirium not POA   Likely steroid induced.    - frequent redirection, adjust sleep wake cycle, avoid deliriogenic meds  - patient is redirectable and completes steroids tomorrow, will hold off antipsychotic.         Macrocytosis suspect secondary to alcohol use  -  -B12 667-  6527  -Thiamine/ folic acid     Left foot swelling 2/2 gout attack  - Xray w/o Fracture and Duplex neg fr DVT   - uric acid 8.8   -Colchicine 1.8 mg day 1 and 0.6 mg x 3 days   -Allopurinol once acute flare is over     Alcohol use disorder  -Patient states she was a daily drinker until her first born child and then she drinks couple of times a week  -Patient history of alcohol withdrawal in the past  -CIWA protocol without Ativan  -Thiamine folic acid        Abnormal UA   -UA with bacteria, leukoesterase and positive nitrites  -Urine culture less than 1000           Body mass index is 38.79 kg/m². Code Status: Full   Surrogate Decision Maker:     DVT Prophylaxis: Lovenox  GI Prophylaxis: not indicated             Subjective:     Chief Complaint / Reason for Physician Visit  \"im doing fine\". Mother at bedside. Discussed with RN events overnight - hallucinations last night. Review of Systems:  No fevers, chills, appetite change, cough, sputum production, shortness of breath, dyspnea on exertion, nausea, vomitting, diarrhea, constipation, chest pain, leg edema, abdominal pain, joint pain, rash, itching. Tolerating PT/OT. Tolerating diet. Objective:     VITALS:   Last 24hrs VS reviewed since prior progress note. Most recent are:  Patient Vitals for the past 24 hrs:   Temp Pulse Resp BP SpO2   11/04/21 1100 (!) 96 °F (35.6 °C) 92 16 (!) 172/109 100 %   11/04/21 0739 97.4 °F (36.3 °C) (!) 101 18 (!) 140/86 100 %   11/04/21 0400  (!) 101 20 (!) 140/78 100 %   11/04/21 0000  100      11/03/21 2022 98.7 °F (37.1 °C) 99 18 133/80 100 %   11/03/21 2000  (!) 102      11/03/21 1500 98.3 °F (36.8 °C) (!) 110 18 130/79 100 %       Intake/Output Summary (Last 24 hours) at 11/4/2021 1224  Last data filed at 11/4/2021 0456  Gross per 24 hour   Intake    Output 300 ml   Net -300 ml        PHYSICAL EXAM:  General: Alert, cooperative, no acute distress    EENT:  EOMI. Anicteric sclerae. MMM  Resp:  CTA bilaterally, no wheezing or rales. No accessory muscle use  CV:  Regular  rhythm,  normal S1/S2, no murmurs rubs gallops, No edema  GI:  Soft, Non distended, Non tender. +Bowel sounds  Neurologic:  Alert and oriented X 3, normal speech,   Psych:   Fair insight.  Not anxious nor agitated  Skin:  No rashes. No jaundice    Reviewed most current lab test results and cultures  YES  Reviewed most current radiology test results   YES  Review and summation of old records today    NO  Reviewed patient's current orders and MAR    YES  PMH/SH reviewed - no change compared to H&P  ________________________________________________________________________  Care Plan discussed with:    Comments   Patient x    Family      RN x    Care Manager x    Consultant                       x Multidiciplinary team rounds were held today with , nursing, pharmacist and clinical coordinator. Patient's plan of care was discussed; medications were reviewed and discharge planning was addressed. ________________________________________________________________________  Total NON critical care TIME:  25   Minutes    Total CRITICAL CARE TIME Spent:   Minutes non procedure based      Comments   >50% of visit spent in counseling and coordination of care     ________________________________________________________________________  Heather Rodrigues MD     Procedures: see electronic medical records for all procedures/Xrays and details which were not copied into this note but were reviewed prior to creation of Plan. LABS:  I reviewed today's most current labs and imaging studies.   Pertinent labs include:  Recent Labs     11/04/21 0359   WBC 5.4   HGB 8.8*   HCT 26.6*        Recent Labs     11/04/21  0359      K 3.3*      CO2 23   *   BUN 13   CREA 0.60   CA 8.1*   MG 2.0   PHOS 2.8   ALB 2.5*   TBILI 1.1*   ALT 34       Signed: Heather Rodrigues MD

## 2021-11-04 NOTE — PROGRESS NOTES
Problem: Mobility Impaired (Adult and Pediatric)  Goal: *Acute Goals and Plan of Care (Insert Text)  Description: FUNCTIONAL STATUS PRIOR TO ADMISSION: Patient was modified independent using a single point cane for functional mobility. Driving up until a few months ago when her vision got blurry, In the last week weakness became progress to the point where she was mobilizing in a wheelchair    [facility]: The patient lived with  and 3 young children. Physical Therapy Goals  Initiated 11/1/2021  1. Patient will move from supine to sit and sit to supine  in bed with moderate assistance  within 7 day(s). 2.  Patient will transfer from bed to chair and chair to bed with moderate assistance  using the least restrictive device within 7 day(s). 3.  Patient will perform sit to stand with moderate assistance  within 7 day(s). 4.  Patient will sit EOB with CGA for 10min within 7 days     Outcome: Progressing Towards Goal     PHYSICAL THERAPY TREATMENT  Patient: Marika Nguyen (89 y.o. female)  Date: 11/4/2021  Diagnosis: Rapidly progressive weakness [R53.1]   <principal problem not specified>       Precautions: Fall, Skin  Chart, physical therapy assessment, plan of care and goals were reviewed. ASSESSMENT  Patient continues with skilled PT services and is progressing towards goals. Patient continues to require total assistance for all bed mobility. Patient limited by weakness, impaired balance, and pain. Patient with significant pain in bilateral hands and bilateral LEs from knees to feet which she describes as burning and tingling. Patient with fair to poor sitting balance. She demonstrates frequent posterior losses of balance and requires tactile and verbal cues to correct. Patient with limited insight into deficits.       Current Level of Function Impacting Discharge (mobility/balance): total assistance    Other factors to consider for discharge: weakness, pain, impaired balance and cognition         PLAN :  Patient continues to benefit from skilled intervention to address the above impairments. Continue treatment per established plan of care. to address goals. Recommendation for discharge: (in order for the patient to meet his/her long term goals)  Therapy 3 hours per day 5-7 days per week    This discharge recommendation:  Has been made in collaboration with the attending provider and/or case management    IF patient discharges home will need the following DME: to be determined (TBD)       SUBJECTIVE:   Patient stated I just came down from upstairs.     OBJECTIVE DATA SUMMARY:   Critical Behavior:  Neurologic State: (P) Eyes open spontaneously  Orientation Level: (P) Oriented to person  Cognition: (P) Follows commands  Safety/Judgement: Awareness of environment, Decreased insight into deficits, Fall prevention    Functional Mobility Training:  Bed Mobility:  Rolling: Total assistance  Supine to Sit: Total assistance  Sit to Supine: Total assistance  Scooting: Total assistance       Balance:  Sitting: Impaired; With support  Sitting - Static: Fair (occasional)  Sitting - Dynamic: Poor (constant support)    Pain Rating:  Significant B hand and BLE (knees to feet) pain; unable to rate; RN aware    Activity Tolerance:   Poor    After treatment patient left in no apparent distress:   Supine in bed, Call bell within reach, Bed / chair alarm activated, and Side rails x 3    COMMUNICATION/COLLABORATION:   The patients plan of care was discussed with: Registered nurse.      Tricia Favre, PT   Time Calculation: 14 mins

## 2021-11-04 NOTE — PROGRESS NOTES
CINTHIA  RUR OBSERVATION    1000  IDT met to discuss plan of care. LOS 4d  ELOS 5d      Order for Ensure. Continue with regular diet. Mother in visiting patient. Plan is for transition to [facility] for continued care.     Carla Subramanian, AMALIA/BILLY  [phone]

## 2021-11-04 NOTE — PROGRESS NOTES
Pt stating she needs to pee but refusing bedpan and wanting to walk to bathroom. Isacc placed. Also TeleMD for melatonin for sleep. Spoke to sister on cellphone who states she is talking to people who are not there. Pt c/o \"a man in the room and behind the door. \"

## 2021-11-05 VITALS
RESPIRATION RATE: 16 BRPM | OXYGEN SATURATION: 98 % | TEMPERATURE: 97.9 F | DIASTOLIC BLOOD PRESSURE: 101 MMHG | WEIGHT: 217.37 LBS | HEART RATE: 94 BPM | SYSTOLIC BLOOD PRESSURE: 150 MMHG | HEIGHT: 63 IN | BODY MASS INDEX: 38.52 KG/M2

## 2021-11-05 LAB — ALDOLASE SERPL-CCNC: 7.2 U/L (ref 3.3–10.3)

## 2021-11-05 PROCEDURE — 74011250636 HC RX REV CODE- 250/636: Performed by: STUDENT IN AN ORGANIZED HEALTH CARE EDUCATION/TRAINING PROGRAM

## 2021-11-05 PROCEDURE — G0378 HOSPITAL OBSERVATION PER HR: HCPCS

## 2021-11-05 PROCEDURE — 96372 THER/PROPH/DIAG INJ SC/IM: CPT

## 2021-11-05 PROCEDURE — 74011250637 HC RX REV CODE- 250/637: Performed by: STUDENT IN AN ORGANIZED HEALTH CARE EDUCATION/TRAINING PROGRAM

## 2021-11-05 PROCEDURE — 74011000258 HC RX REV CODE- 258: Performed by: STUDENT IN AN ORGANIZED HEALTH CARE EDUCATION/TRAINING PROGRAM

## 2021-11-05 PROCEDURE — 96365 THER/PROPH/DIAG IV INF INIT: CPT

## 2021-11-05 RX ORDER — LANOLIN ALCOHOL/MO/W.PET/CERES
3 CREAM (GRAM) TOPICAL
Qty: 30 TABLET | Refills: 0 | Status: SHIPPED | OUTPATIENT
Start: 2021-11-05

## 2021-11-05 RX ORDER — TRAMADOL HYDROCHLORIDE 50 MG/1
50 TABLET ORAL
Qty: 10 TABLET | Refills: 0 | Status: SHIPPED
Start: 2021-11-05 | End: 2021-11-08

## 2021-11-05 RX ORDER — ALLOPURINOL 100 MG/1
100 TABLET ORAL DAILY
Qty: 30 TABLET | Refills: 0 | Status: SHIPPED
Start: 2021-11-06

## 2021-11-05 RX ORDER — METOPROLOL SUCCINATE 25 MG/1
25 TABLET, EXTENDED RELEASE ORAL DAILY
Qty: 30 TABLET | Refills: 0 | Status: SHIPPED
Start: 2021-11-05

## 2021-11-05 RX ADMIN — TRAMADOL HYDROCHLORIDE 50 MG: 50 TABLET, COATED ORAL at 14:38

## 2021-11-05 RX ADMIN — Medication 100 MG: at 08:13

## 2021-11-05 RX ADMIN — DULOXETINE HYDROCHLORIDE 30 MG: 30 CAPSULE, DELAYED RELEASE ORAL at 08:13

## 2021-11-05 RX ADMIN — GABAPENTIN 200 MG: 100 CAPSULE ORAL at 08:13

## 2021-11-05 RX ADMIN — ALLOPURINOL 100 MG: 100 TABLET ORAL at 08:13

## 2021-11-05 RX ADMIN — SODIUM CHLORIDE 500 MG: 900 INJECTION INTRAVENOUS at 09:27

## 2021-11-05 RX ADMIN — ENOXAPARIN SODIUM 40 MG: 100 INJECTION SUBCUTANEOUS at 08:13

## 2021-11-05 RX ADMIN — METOPROLOL TARTRATE 12.5 MG: 25 TABLET, FILM COATED ORAL at 08:13

## 2021-11-05 RX ADMIN — Medication 10 ML: at 05:32

## 2021-11-05 RX ADMIN — TRAMADOL HYDROCHLORIDE 50 MG: 50 TABLET, COATED ORAL at 05:31

## 2021-11-05 RX ADMIN — GABAPENTIN 200 MG: 100 CAPSULE ORAL at 12:31

## 2021-11-05 RX ADMIN — FOLIC ACID 1 MG: 1 TABLET ORAL at 08:13

## 2021-11-05 NOTE — PROGRESS NOTES
Neurology Progress Note    NAME:  Margie Sandoval   :   1990   MRN:   [id number]     Date/Time: 2021 7:52 AM  Subjective:   Patient in a lot of pain  Somewhat confused and seeing things that are not there.   Barely following simple order command,  Review of Systems:  Y  N       Y  N  [] [x]  Fever/chills                                               [] [x]  Chest Pain  [] [x]  Cough                                                       [] [x]  Diarrhea   [] [x]  Sputum                                                     [] [x]  Constipation  [] [x]  SOB/GARCIA                                                [] [x]  Nausea/Vomit  [] [x]  Abd Pain                                                    [x] []  Tolerating PT  [] [x]  Dysuria                                                      [x] []  Tolerating Diet        Medications reviewed:  Current Facility-Administered Medications   Medication Dose Route Frequency    melatonin tablet 3 mg  3 mg Oral QHS PRN    allopurinoL (ZYLOPRIM) tablet 100 mg  100 mg Oral DAILY    metoprolol tartrate (LOPRESSOR) tablet 12.5 mg  12.5 mg Oral BID    gabapentin (NEURONTIN) capsule 200 mg  200 mg Oral QID    DULoxetine (CYMBALTA) capsule 30 mg  30 mg Oral DAILY    naloxone (NARCAN) injection 0.4 mg  0.4 mg IntraVENous EVERY 2 MINUTES AS NEEDED    [Held by provider] tiZANidine (ZANAFLEX) tablet 4 mg  4 mg Oral QHS    traMADoL (ULTRAM) tablet 50 mg  50 mg Oral Q6H PRN    sodium chloride (NS) flush 5-40 mL  5-40 mL IntraVENous Q8H    sodium chloride (NS) flush 5-40 mL  5-40 mL IntraVENous PRN    acetaminophen (TYLENOL) tablet 650 mg  650 mg Oral Q6H PRN    Or    acetaminophen (TYLENOL) suppository 650 mg  650 mg Rectal Q6H PRN    polyethylene glycol (MIRALAX) packet 17 g  17 g Oral DAILY PRN    ondansetron (ZOFRAN ODT) tablet 4 mg  4 mg Oral Q8H PRN    Or    ondansetron (ZOFRAN) injection 4 mg  4 mg IntraVENous Q6H PRN    enoxaparin (LOVENOX) injection 40 mg  40 mg SubCUTAneous DAILY    thiamine HCL (B-1) tablet 100 mg  100 mg Oral DAILY    folic acid (FOLVITE) tablet 1 mg  1 mg Oral DAILY     Current Outpatient Medications   Medication Sig    traMADoL (ULTRAM) 50 mg tablet Take 1 Tablet by mouth every six (6) hours as needed (mod-severe pain) for up to 3 days. Max Daily Amount: 200 mg.    metoprolol succinate (TOPROL-XL) 25 mg XL tablet Take 1 Tablet by mouth daily.  melatonin 3 mg tablet Take 1 Tablet by mouth nightly as needed for Insomnia.  [START ON 2021] allopurinoL (ZYLOPRIM) 100 mg tablet Take 1 Tablet by mouth daily.  folic acid (FOLVITE) 1 mg tablet Take 1 mg by mouth daily.  ondansetron hcl (Zofran) 4 mg tablet Take 4 mg by mouth every eight (8) hours as needed for Nausea or Vomiting.  gabapentin (NEURONTIN) 100 mg capsule Take 200 mg by mouth four (4) times daily.  DULoxetine (CYMBALTA) 30 mg capsule Take 30 mg by mouth daily. Objective:   Vitals:  Visit Vitals  BP (!) 150/101   Pulse 94   Temp 97.9 °F (36.6 °C)   Resp 16   Ht 5' 3\" (1.6 m)   Wt 217 lb 6 oz (98.6 kg)   SpO2 98%   BMI 38.51 kg/m²     Temp (24hrs), Av.8 °F (36.6 °C), Min:96.5 °F (35.8 °C), Max:99.2 °F (37.3 °C)      O2 Device: None (Room air)      PHYSICAL EXAM:  General:    Alert, cooperative, no distress, appears stated age. Head:   Normocephalic, without obvious abnormality, atraumatic. Eyes:   Conjunctivae/corneas clear. PERRLA  Nose:  Nares normal. No drainage or sinus tenderness. Throat:    Lips, mucosa, and tongue normal.  No Thrush  Neck:  Supple, symmetrical,  no adenopathy, thyroid: non tender    no carotid bruit and no JVD. Back:    Symmetric, diffuse tenderness. Lungs:   Clear to auscultation bilaterally. No Wheezing or Rhonchi. No rales. Chest wall:  No tenderness or deformity. No Accessory muscle use. Heart:   Regular rate and rhythm,  no murmur, rub or gallop. Abdomen:   Soft, non-tender. Not distended.   Bowel sounds normal. No masses  Extremities: Extremities normal, atraumatic, No cyanosis. No edema. No clubbing  Skin:     Texture, turgor normal. No rashes or lesions. Not Jaundiced  Lymph nodes: Cervical, supraclavicular normal.  Psych:  Confused. NEUROLOGICAL EXAM:  Appearance: The patient is well developed, well nourished,  is in painful distress. Mental Status: Oriented to  person. Mood and affect flat. Cranial Nerves:   Intact visual fields. Fundi are benign. DON, EOM's full, no nystagmus, no ptosis. Facial sensation is normal. Corneal reflexes are intact. Facial movement is symmetric. Hearing is normal bilaterally. Palate is midline with normal sternocleidomastoid and trapezius muscles are normal. Tongue is midline. Motor:   Moves all extremities. Normal bulk and tone. No fasciculations. Reflexes:   Deep tendon reflexes +/4 and symmetrical.   Sensory:    Allodynia. Gait:   Not assessed. Tremor:   No tremor noted. Cerebellar:  No cerebellar signs present. Neurovascular:  Normal heart sounds and regular rhythm, peripheral pulses intact, and no carotid bruits. Lab Data Reviewed:  MRI of cervical spine pending  No results found for this or any previous visit (from the past 24 hour(s)). Assesment  Active Problems:    Rapidly progressive weakness (11/1/2021)    Demyelinating disease  Multiple sclerosis  Pain  Rule out myelopathy  Visual hallucination, most likely secondary to steroid  __________________________________________________  PLAN:    1. We will complete steroid course  2. I will consider obtaining MRI of the thoracic and lumbar spine  3. Physical therapy  4. Occupational Therapy  5. Adjust Neurontin to 300 mg p.o. twice daily  6.  Baclofen 10 mg p.o. twice daily        ___________________________________________________    Total time spent with patient:  []15   []25   [x]35   [] __ minutes    []Critical Care Provided        ___________________________________________________    Attending Physician: Radha Sinha MD

## 2021-11-05 NOTE — PROGRESS NOTES
Problem: Patient Education: Go to Patient Education Activity  Goal: Patient/Family Education  Outcome: Resolved/Met     Problem: Falls - Risk of  Goal: *Absence of Falls  Description: Document Holly Crocker Fall Risk and appropriate interventions in the flowsheet. 11/5/2021 1139 by Jesus Chavez RN  Outcome: Resolved/Met  11/5/2021 1102 by Jesus Chavez RN  Outcome: Progressing Towards Goal  Note: Fall Risk Interventions:       Mentation Interventions: Bed/chair exit alarm    Medication Interventions: Bed/chair exit alarm    Elimination Interventions: Call light in reach    History of Falls Interventions: Door open when patient unattended         Problem: Patient Education: Go to Patient Education Activity  Goal: Patient/Family Education  Outcome: Resolved/Met     Problem: Patient Education: Go to Patient Education Activity  Goal: Patient/Family Education  11/5/2021 1139 by Jesus Chavez RN  Outcome: Resolved/Met  11/5/2021 1102 by Jesus Chavez RN  Outcome: Progressing Towards Goal     Problem: Pressure Injury - Risk of  Goal: *Prevention of pressure injury  Description: Document José Miguel Scale and appropriate interventions in the flowsheet.   11/5/2021 1139 by Jesus Chavez RN  Outcome: Resolved/Met  11/5/2021 1102 by Jesus Chavez RN  Outcome: Progressing Towards Goal  Note: Pressure Injury Interventions:  Sensory Interventions: Assess changes in LOC    Moisture Interventions: Apply protective barrier, creams and emollients    Activity Interventions: PT/OT evaluation    Mobility Interventions: PT/OT evaluation    Nutrition Interventions: Offer support with meals,snacks and hydration    Friction and Shear Interventions: Apply protective barrier, creams and emollients                Problem: Patient Education: Go to Patient Education Activity  Goal: Patient/Family Education  Outcome: Resolved/Met

## 2021-11-05 NOTE — PROGRESS NOTES
0700) Bedside shift change report given to Gabrielle Faulkner RN (oncoming nurse) by Fer Corley RN (offgoing nurse). Report included the following information SBAR, Kardex, Intake/Output, MAR, Recent Results and Cardiac Rhythm NSR to Sinus Tach.     0750) Pt assessed, HR tachy at 103 and BP elevated, rechecked manually and 146/92. Pt has no c/o pain but states numbness and hypersensitivity to touch in all 4 extremities and edema in BLE. Pt actively hallucinating at this time. Alert and oriented to person. Morning meds given and pt repositioned in bed for breakfast.     0920) Pt repositioned on left side. Solu-medrol hung and infusing. 1000) Interdisciplinary team rounds were held 11/5/2021 with the following team members:Care Management, Nursing, Pharmacy and Physician. Plan of care discussed. See clinical pathway and/or care plan for interventions and desired outcomes. 1030) Incontinence care given and pt repositioned in bed, heels floated. Bed bath given and lotion applied. Solu-medrol completed and IV flushed, clamped and capped. Pt left resting in bed at this time. 65) Spoke with pt's  regarding transfer today.  expressed concerns about social serviced help with  and discharge time. Gricelda notified, will continue to update . Per  if he is not able to  his wallet (left here during visitation last night) before transfer, he would like for us to send it down with security. 1130) Pt resting in bed at this time. [address] paperwork given, medications retrieved from pharmacy and placed in pt belonging bag.     1220)  TRANSFER - OUT REPORT:    Verbal report given to Yuridia Santo RN (name) on [facility]  being transferred to Rehab (unit) for routine progression of care       Report consisted of patients Situation, Background, Assessment and   Recommendations(SBAR).      Information from the following report(s) SBAR, Kardex, Intake/Output, MAR, Recent Results and Cardiac Rhythm NSR to Sinus Tach was reviewed with the receiving nurse. Opportunity for questions and clarification was provided. Patient transported with: JEANNETTE   Registered Nurse     4181) Pt reassessed and no changes to note. Vital signs stable but BP still elevated. Gabapentin given. IV removed and pt left resting in bed at this time. 18) Pt  notified that security is unable to hold onto personal belongings after discharge.  made aware that belongings will be sent with her to [facility]. Pt belongings gathered together at bedside. 1430) Pt c/o pain 6/10 to left leg, PRN 50 mg tramadol given. EMS here to  pt. Report given to transport and pt taken off unit via stretcher.

## 2021-11-05 NOTE — PROGRESS NOTES
CINTHIA  RUR OBS    1002  CM called [facility] [phone]. They have received authorization and will be able to take patient today. IDT met today to discuss plan of care. Patient stable and ready to go to [facility]. Yulissa Jacob called [facility] [phone] to schedule ambulance transport. Talked with Omar Carrasco. Transport has been scheduled for today at 1:45pm.  The conformation number is [id number]. CM called Lis at [facility] to let them know time of discharge. Patient will be going to room 108. Nursing report needs to be called to [phone]. Tristen Biggs stated that they have access to the records they need and do not need information faxed to them. [facility] to [facility], [address]  Hours:   Open 24 hours  Phone: [phone]. CM called and spoke to patient's  about discharge to [facility]. He is agreement with transfer. Follow-up with both the rheumatologist and neurologist at [facility]   Next steps: Follow up   [employer]    Next steps: Follow up   [place] call for  help with community resources and request for [facility] for help in the home    [employer]    Transportation to medical appointment    Next steps: Follow up   Please call the # on the back of your Insurance Card.  Needs to be call  5-7 days before appointments. Anotine Hurtado    Next steps: Follow up   [phone]   it is very important to keep this appointmnet to help access services. [employer]    Go to [facility] on 11/5/2021   Specialty: Rehabilitation   [address]   [address] [facility] [address] [phone]   Your are going to [facility] for further rehabilitation and treatment. Care Management Interventions  PCP Verified by CM:  Yes  Transition of Care Consult (CM Consult): Discharge Planning, Acute Rehab  Physical Therapy Consult: Yes  Occupational Therapy Consult: Yes  Support Systems: Spouse/Significant Other  The Plan for Transition of Care is Related to the Following Treatment Goals : Transition to [facility]  The Patient and/or Patient Representative was Provided with a Choice of Provider and Agrees with the Discharge Plan?: Yes  Name of the Patient Representative Who was Provided with a Choice of Provider and Agrees with the Discharge Plan: Patient,   Freedom of Choice List was Provided with Basic Dialogue that Supports the Patient's Individualized Plan of Care/Goals, Treatment Preferences and Shares the Quality Data Associated with the Providers?: Yes  Discharge Location  Discharge Placement: Rehab hospital/unit [facility]      AMALIA Devine/BILLY  [phone]

## 2021-11-05 NOTE — PROGRESS NOTES
Problem: Patient Education: Go to Patient Education Activity  Goal: Patient/Family Education  Outcome: Resolved/Met     Problem: Falls - Risk of  Goal: *Absence of Falls  Description: Document Marion Fall Risk and appropriate interventions in the flowsheet. 11/5/2021 1139 by Darrell Nolasco RN  Outcome: Resolved/Met  11/5/2021 1102 by Darrell Nolasco RN  Outcome: Progressing Towards Goal  Note: Fall Risk Interventions:       Mentation Interventions: Bed/chair exit alarm    Medication Interventions: Bed/chair exit alarm    Elimination Interventions: Call light in reach    History of Falls Interventions: Door open when patient unattended         Problem: Patient Education: Go to Patient Education Activity  Goal: Patient/Family Education  Outcome: Resolved/Met     Problem: Patient Education: Go to Patient Education Activity  Goal: Patient/Family Education  11/5/2021 1139 by Darrell Nolasco RN  Outcome: Resolved/Met  11/5/2021 1102 by Darrell Nolasco RN  Outcome: Progressing Towards Goal     Problem: Pressure Injury - Risk of  Goal: *Prevention of pressure injury  Description: Document José Miguel Scale and appropriate interventions in the flowsheet.   11/5/2021 1139 by Darrell Nolasco RN  Outcome: Resolved/Met  11/5/2021 1102 by Darrell Nolasco RN  Outcome: Progressing Towards Goal  Note: Pressure Injury Interventions:  Sensory Interventions: Assess changes in LOC    Moisture Interventions: Apply protective barrier, creams and emollients    Activity Interventions: PT/OT evaluation    Mobility Interventions: PT/OT evaluation    Nutrition Interventions: Offer support with meals,snacks and hydration    Friction and Shear Interventions: Apply protective barrier, creams and emollients                Problem: Patient Education: Go to Patient Education Activity  Goal: Patient/Family Education  Outcome: Resolved/Met

## 2021-11-05 NOTE — PROGRESS NOTES
Problem: Falls - Risk of  Goal: *Absence of Falls  Description: Document Vikki Patel Fall Risk and appropriate interventions in the flowsheet. Outcome: Progressing Towards Goal  Note: Fall Risk Interventions:       Mentation Interventions: Bed/chair exit alarm    Medication Interventions: Bed/chair exit alarm    Elimination Interventions: Call light in reach    History of Falls Interventions: Door open when patient unattended         Problem: Patient Education: Go to Patient Education Activity  Goal: Patient/Family Education  Outcome: Progressing Towards Goal     Problem: Pressure Injury - Risk of  Goal: *Prevention of pressure injury  Description: Document José Miguel Scale and appropriate interventions in the flowsheet.   Outcome: Progressing Towards Goal  Note: Pressure Injury Interventions:  Sensory Interventions: Assess changes in LOC    Moisture Interventions: Apply protective barrier, creams and emollients    Activity Interventions: PT/OT evaluation    Mobility Interventions: PT/OT evaluation    Nutrition Interventions: Offer support with meals,snacks and hydration    Friction and Shear Interventions: Apply protective barrier, creams and emollients

## 2021-11-05 NOTE — PROGRESS NOTES
Pharmacist Discharge Medication Reconciliation    Discharge Provider:  Dr. Emily Mccoy     Discharge Medications:      My Medications        START taking these medications        Instructions Each Dose to Equal Morning Noon Evening Bedtime   allopurinoL 100 mg tablet  Commonly known as: ZYLOPRIM  Start taking on: November 6, 2021      Take 1 Tablet by mouth daily. 100 mg         melatonin 3 mg tablet      Take 1 Tablet by mouth nightly as needed for Insomnia. 3 mg         metoprolol succinate 25 mg XL tablet  Commonly known as: TOPROL-XL      Take 1 Tablet by mouth daily. 25 mg         traMADoL 50 mg tablet  Commonly known as: ULTRAM      Take 1 Tablet by mouth every six (6) hours as needed (mod-severe pain) for up to 3 days. Max Daily Amount: 200 mg.   50 mg                CONTINUE taking these medications        Instructions Each Dose to Equal Morning Noon Evening Bedtime   DULoxetine 30 mg capsule  Commonly known as: CYMBALTA      Take 30 mg by mouth daily. 30 mg         folic acid 1 mg tablet  Commonly known as: FOLVITE      Take 1 mg by mouth daily. 1 mg         gabapentin 100 mg capsule  Commonly known as: NEURONTIN      Take 200 mg by mouth four (4) times daily. 200 mg         Zofran 4 mg tablet  Generic drug: ondansetron hcl      Take 4 mg by mouth every eight (8) hours as needed for Nausea or Vomiting. 4 mg                   Where to Get Your Medications        These medications were sent to [address], [address] [place] [address] & Jul[address]ower,[address], [address]      Phone: [phone]   · melatonin 3 mg tablet       Information on where to get these meds will be given to you by the nurse or doctor.     Ask your nurse or doctor about these medications  · allopurinoL 100 mg tablet  · metoprolol succinate 25 mg XL tablet  · traMADoL 50 mg tablet          The patient's chart, MAR, and AVS were reviewed by   Guciho Estrada, [employer]

## 2021-11-05 NOTE — PROGRESS NOTES
Problem: Falls - Risk of  Goal: *Absence of Falls  Description: Document Todd Nathan Fall Risk and appropriate interventions in the flowsheet. Outcome: Progressing Towards Goal  Note: Fall Risk Interventions:       Mentation Interventions: Adequate sleep, hydration, pain control, Bed/chair exit alarm, Door open when patient unattended    Medication Interventions: Bed/chair exit alarm, Teach patient to arise slowly    Elimination Interventions: Bed/chair exit alarm, Call light in reach, Toileting schedule/hourly rounds    History of Falls Interventions: Bed/chair exit alarm, Door open when patient unattended, Room close to nurse's station         Problem: Pressure Injury - Risk of  Goal: *Prevention of pressure injury  Description: Document José Miguel Scale and appropriate interventions in the flowsheet.   Outcome: Progressing Towards Goal  Note: Pressure Injury Interventions:  Sensory Interventions: Assess changes in LOC, Check visual cues for pain, Discuss PT/OT consult with provider, Float heels, Keep linens dry and wrinkle-free    Moisture Interventions: Absorbent underpads, Check for incontinence Q2 hours and as needed, Internal/External urinary devices, Minimize layers    Activity Interventions: Chair cushion, Increase time out of bed, Pressure redistribution bed/mattress(bed type)    Mobility Interventions: Float heels, HOB 30 degrees or less, Pressure redistribution bed/mattress (bed type)    Nutrition Interventions: Document food/fluid/supplement intake, Offer support with meals,snacks and hydration    Friction and Shear Interventions: HOB 30 degrees or less, Minimize layers

## 2021-11-05 NOTE — PROGRESS NOTES
- received verbal bedside shift change report from [facility], RN to include SBAR, MAR, Kardex and recent results.  - assessment completed. Alert and oriented to name,  and year. Required assistanace to remember what month it is. C/o pain to fingertips when touched and to bilateral feet/ankle/toes when touched. Has numbness/tingling constantly to bilateral hands/fingertips and to bilateral ankles/feet. BUE  very weak. Resistance w/ feet minimal and weak. Pt making statements that aren't true - delusions present - first thought she was in her home but pt was re-oriented to hospital room and then stated that she did realize she was in the hospital and not at home. Pt then tried talking to her son and another family member that weren't present. When pt was informed they weren't in the room, she continued calling their names and looking for them. Pt on seizure precautions.  - c/o 6/10 pain to bilateral hands and bilateral ankles - Gave Tramadol 50mg PO.     0010 - resting in bed - awake - quiet - resp WDL. Denies need for anything. Oriented pt to time, situation and location and stated understanding. 0700 - reassessment completed - no change in pt condition. Has not slept. Verbal statements from pt have been frequently illogical and delusional. Pt has had to be frequently re-oriented to situation and location. 0405 - lying in bed awake - repositioned pt - this has been very painful to pt - particularly turning. 2420 - Pt c/o severe pain to L hip when turning w/ staff assist for incontinence care. Moaning and vocalizing pain. Gave Tramadol 50mg PO. Incontinence care completed. Brief and meera changed. Pt has not slept.     715 - gave verbal bedside shift change report to Ciera Aquino RN To include SBAR, MAR, Kardex and recent results.

## 2021-11-05 NOTE — DISCHARGE SUMMARY
Hospitalist Discharge Summary     Patient ID:  Ximena Camargo  [id number]  62 y.o.  1990    PCP on record: None    Admit date: 10/31/2021  Discharge date and time: 11/5/2021      Admission Diagnoses: Rapidly progressive weakness [R53.1]    Discharge Diagnoses: Active Problems:    Rapidly progressive weakness (11/1/2021)           Hospital Course:     Demyelinating disease POA  Peripheral neuropathy ? ETOH induced   Chronic lower extremity weakness  Metabolic encephalopathy unknown whether acute or chronic  CT head negative for acute process. Extensive work-up done at [facility]-mostly unremarkable. LP done 3/1/2021: WBC 2 protein 24.3 glucose 63. VDRL CSF nonreactive nonreactive, herpes 1 and 2 PCR CSF negative. HIV neg  2/26/2021. Hepatitis B and C test  Neg 11/12/2020. Double-stranded DNA negative, centromere antibody negative, SSA/  SS- B antibody negative, [place] 1 antibody neg- 2/20/2021. Scleroderma 70 - 2.9 - 2/20/21. MRI 11/1: Abnormal FLAIR signal intensity predominantly in the splenium of the corpus callosum with mild associated diffusion restriction. No intracranial mass hemorrhage. B12 585 folate 15. free T4 1.2      -Neurology consulted - completed pulse dose steroids  -PT OT recommend acute rehab  -outpatient neurology follow up           Sinus tachycardia,chronic POA  - Free T4 1.2   - EKG w/ sinus tachycardia   - start low dose metoprolol     Delirium not POA   Likely steroid induced.    - frequent redirection, adjust sleep wake cycle, avoid deliriogenic meds  - patient is redirectable and completes steroids tomorrow, will hold off antipsychotic.         Macrocytosis suspect secondary to alcohol use  -  -B12 667-  7/50/8131  -Thiamine/ folic acid     Left foot swelling 2/2 gout attack  - Xray w/o Fracture and Duplex neg fr DVT   - uric acid 8.8   -Colchicine 1.8 mg day 1 and 0.6 mg x 3 days   -Allopurinol once acute flare is over     Alcohol use disorder  -Patient states she was a daily drinker until her first born child and then she drinks couple of times a week  -Patient history of alcohol withdrawal in the past  -Thiamine folic acid        Abnormal UA   -UA with bacteria, leukoesterase and positive nitrites  -Urine culture less than 1000        CONSULTATIONS:  IP CONSULT TO NEUROLOGY  IP CONSULT TO PSYCHIATRY    Excerpted HPI from H&P of Dorian Pollard MD:  28 y.o.  female with PMH of above-mentioned problems who presents to ED with c/o *lower extremity weakness and unable to take care of herself at home. Patient states that she has been diagnosed with neuropathy 4 years ago and she has been declining for past 1-1/2-year. Patient is wheelchair-bound and is taken care of by her  at home with 3 kids. Patient today denied any active chest pain belly pain difficulty breathing or shortness of breath. Patient endorsed pain in left foot. Patient stated that she used to drink alcohol every single day and used to have alcohol withdrawal when she would remain abstinent from alcohol. But after her first born she has cut back and now she drinks few days a week. Last drink was yesterday. Patient  was not able to take care of her at home at this time.     We were asked to admit for work up and evaluation of the above problems.      History reviewed. No pertinent past medical history.      History reviewed. No pertinent surgical history. ______________________________________________________________________  DISCHARGE SUMMARY/HOSPITAL COURSE:  for full details see H&P, daily progress notes, labs, consult notes.      Visit Vitals  BP (!) 146/92 (BP 1 Location: Right upper arm, BP Patient Position: At rest)   Pulse (!) 102   Temp 97.4 °F (36.3 °C)   Resp 16   Ht 5' 3\" (1.6 m)   Wt 98.6 kg (217 lb 6 oz)   SpO2 100%   BMI 38.51 kg/m²       _______________________________________________________________________  Patient seen and examined by me on discharge day. Pertinent Findings:  Gen:    Not in distress  Chest: Clear lungs  CVS:   Regular rhythm. No edema  Abd:  Soft, not distended, not tender  Neuro:  Alert with good insight. Oriented to person, place, and time   _______________________________________________________________________  DISCHARGE MEDICATIONS:   Current Discharge Medication List      START taking these medications    Details   traMADoL (ULTRAM) 50 mg tablet Take 1 Tablet by mouth every six (6) hours as needed (mod-severe pain) for up to 3 days. Max Daily Amount: 200 mg. Qty: 10 Tablet, Refills: 0  Start date: 11/5/2021, End date: 11/8/2021    Associated Diagnoses: Neuropathy      metoprolol succinate (TOPROL-XL) 25 mg XL tablet Take 1 Tablet by mouth daily. Qty: 30 Tablet, Refills: 0  Start date: 11/5/2021      melatonin 3 mg tablet Take 1 Tablet by mouth nightly as needed for Insomnia. Qty: 30 Tablet, Refills: 0  Start date: 11/5/2021      allopurinoL (ZYLOPRIM) 100 mg tablet Take 1 Tablet by mouth daily. Qty: 30 Tablet, Refills: 0  Start date: 11/6/2021         CONTINUE these medications which have NOT CHANGED    Details   folic acid (FOLVITE) 1 mg tablet Take 1 mg by mouth daily. ondansetron hcl (Zofran) 4 mg tablet Take 4 mg by mouth every eight (8) hours as needed for Nausea or Vomiting.      gabapentin (NEURONTIN) 100 mg capsule Take 200 mg by mouth four (4) times daily. DULoxetine (CYMBALTA) 30 mg capsule Take 30 mg by mouth daily. My Recommended Diet, Activity, Wound Care, and follow-up labs are listed in the patient's Discharge Insturctions which I have personally completed and reviewed.     _______________________________________________________________________  DISPOSITION:     Home with Family:    Home with HH/PT/OT/RN:    SNF/LTC:    ABE: x   OTHER:        Condition at Discharge:  Stable  _______________________________________________________________________  Follow up with:   PCP : None  Follow-up Information     Follow up With Specialties Details Why Contact Info    Follow-up with both the rheumatologist and neurologist at [facility] call for  help with community resources and request for [facility] for help in the home     Transportation to medical appointment     Please call the # on the back of your Insurance Card. Needs to be call  5-7 days before appointments. Dr. Reji Romeo     [phone]   it is very important to keep this appointmnet to help access services. [facility] of [address] on 11/5/2021 Your are going to [facility] for further rehabilitation and treatment.  [address] [facility]  [phone]              Total time in minutes spent coordinating this discharge (includes going over instructions, follow-up, prescriptions, and preparing report for sign off to her PCP) :  40 minutes    Signed:  Heather Rodrigues MD

## 2021-11-10 ENCOUNTER — TELEPHONE (OUTPATIENT)
Dept: CASE MANAGEMENT | Age: 31
End: 2021-11-10

## 2021-11-10 NOTE — TELEPHONE ENCOUNTER
CM called patient for the purpose of follow up to inpatient admission to check on environmental challenges/medications/appointment follow up/and questions/concerns. The call was answered by  left message to return call. CM will attempt a 2nd call.     [address] [place] [address]  [phone]

## 2022-03-20 PROBLEM — R53.1 RAPIDLY PROGRESSIVE WEAKNESS: Status: ACTIVE | Noted: 2021-11-01

## 2024-03-02 ENCOUNTER — HOSPITAL ENCOUNTER (EMERGENCY)
Facility: HOSPITAL | Age: 34
Discharge: HOME | End: 2024-03-02

## 2024-03-02 ENCOUNTER — APPOINTMENT (OUTPATIENT)
Facility: HOSPITAL | Age: 34
End: 2024-03-02

## 2024-03-02 VITALS
HEART RATE: 79 BPM | DIASTOLIC BLOOD PRESSURE: 85 MMHG | SYSTOLIC BLOOD PRESSURE: 140 MMHG | RESPIRATION RATE: 20 BRPM | OXYGEN SATURATION: 99 %

## 2024-03-02 DIAGNOSIS — S93.05XA ANKLE DISLOCATION, LEFT, INITIAL ENCOUNTER: ICD-10-CM

## 2024-03-02 DIAGNOSIS — S82.852A TRIMALLEOLAR FRACTURE OF ANKLE, CLOSED, LEFT, INITIAL ENCOUNTER: ICD-10-CM

## 2024-03-02 DIAGNOSIS — S93.05XA: ICD-10-CM

## 2024-03-02 DIAGNOSIS — S82.892A CLOSED FRACTURE OF LEFT ANKLE, INITIAL ENCOUNTER: Primary | ICD-10-CM

## 2024-03-02 PROCEDURE — 96372 THER/PROPH/DIAG INJ SC/IM: CPT

## 2024-03-02 PROCEDURE — 6370000000 HC RX 637 (ALT 250 FOR IP)

## 2024-03-02 PROCEDURE — 99284 EMERGENCY DEPT VISIT MOD MDM: CPT | Performed by: PHYSICIAN ASSISTANT

## 2024-03-02 PROCEDURE — 73610 X-RAY EXAM OF ANKLE: CPT

## 2024-03-02 PROCEDURE — 27810 TREATMENT OF ANKLE FRACTURE: CPT

## 2024-03-02 PROCEDURE — 99284 EMERGENCY DEPT VISIT MOD MDM: CPT

## 2024-03-02 PROCEDURE — 2500000003 HC RX 250 WO HCPCS

## 2024-03-02 PROCEDURE — 6360000002 HC RX W HCPCS

## 2024-03-02 PROCEDURE — 27840 TREAT ANKLE DISLOCATION: CPT | Performed by: PHYSICIAN ASSISTANT

## 2024-03-02 RX ORDER — LIDOCAINE HCL/EPINEPHRINE/PF 2%-1:200K
20 VIAL (ML) INJECTION ONCE
Status: DISCONTINUED | OUTPATIENT
Start: 2024-03-02 | End: 2024-03-02

## 2024-03-02 RX ORDER — LIDOCAINE HYDROCHLORIDE 10 MG/ML
20 INJECTION, SOLUTION EPIDURAL; INFILTRATION; INTRACAUDAL; PERINEURAL ONCE
Status: COMPLETED | OUTPATIENT
Start: 2024-03-02 | End: 2024-03-02

## 2024-03-02 RX ORDER — MIDAZOLAM HYDROCHLORIDE 5 MG/ML
4 INJECTION, SOLUTION INTRAMUSCULAR; INTRAVENOUS ONCE
Status: DISCONTINUED | OUTPATIENT
Start: 2024-03-02 | End: 2024-03-02 | Stop reason: CLARIF

## 2024-03-02 RX ORDER — MIDAZOLAM HYDROCHLORIDE 5 MG/ML
4 INJECTION, SOLUTION INTRAMUSCULAR; INTRAVENOUS ONCE
Status: COMPLETED | OUTPATIENT
Start: 2024-03-02 | End: 2024-03-02

## 2024-03-02 RX ORDER — MIDAZOLAM HYDROCHLORIDE 1 MG/ML
3 INJECTION, SOLUTION INTRAMUSCULAR; INTRAVENOUS ONCE
Status: DISCONTINUED | OUTPATIENT
Start: 2024-03-02 | End: 2024-03-02

## 2024-03-02 RX ORDER — MORPHINE SULFATE 4 MG/ML
4 INJECTION, SOLUTION INTRAMUSCULAR; INTRAVENOUS ONCE
Status: COMPLETED | OUTPATIENT
Start: 2024-03-02 | End: 2024-03-02

## 2024-03-02 RX ORDER — HYDROMORPHONE HYDROCHLORIDE 2 MG/1
2 TABLET ORAL ONCE
Status: COMPLETED | OUTPATIENT
Start: 2024-03-02 | End: 2024-03-02

## 2024-03-02 RX ORDER — ACETAMINOPHEN 500 MG
1000 TABLET ORAL
Status: COMPLETED | OUTPATIENT
Start: 2024-03-02 | End: 2024-03-02

## 2024-03-02 RX ORDER — OXYCODONE HYDROCHLORIDE AND ACETAMINOPHEN 5; 325 MG/1; MG/1
1 TABLET ORAL EVERY 6 HOURS PRN
Qty: 12 TABLET | Refills: 0 | Status: SHIPPED | OUTPATIENT
Start: 2024-03-02 | End: 2024-03-05

## 2024-03-02 RX ORDER — ACETAMINOPHEN 325 MG/1
650 TABLET ORAL EVERY 4 HOURS PRN
Qty: 84 TABLET | Refills: 0 | Status: SHIPPED | OUTPATIENT
Start: 2024-03-02 | End: 2024-03-09

## 2024-03-02 RX ORDER — MIDAZOLAM HYDROCHLORIDE 1 MG/ML
4 INJECTION, SOLUTION INTRAMUSCULAR; INTRAVENOUS ONCE
Status: DISCONTINUED | OUTPATIENT
Start: 2024-03-02 | End: 2024-03-02

## 2024-03-02 RX ADMIN — LIDOCAINE HYDROCHLORIDE 20 ML: 10 INJECTION, SOLUTION EPIDURAL; INFILTRATION; INTRACAUDAL; PERINEURAL at 17:42

## 2024-03-02 RX ADMIN — MORPHINE SULFATE 4 MG: 4 INJECTION, SOLUTION INTRAMUSCULAR; INTRAVENOUS at 16:02

## 2024-03-02 RX ADMIN — ACETAMINOPHEN 1000 MG: 500 TABLET ORAL at 15:40

## 2024-03-02 RX ADMIN — MIDAZOLAM 4 MG: 5 INJECTION INTRAMUSCULAR; INTRAVENOUS at 17:41

## 2024-03-02 RX ADMIN — HYDROMORPHONE HYDROCHLORIDE 2 MG: 2 TABLET ORAL at 20:09

## 2024-03-02 ASSESSMENT — PAIN DESCRIPTION - ORIENTATION
ORIENTATION: LEFT
ORIENTATION: LEFT

## 2024-03-02 ASSESSMENT — PAIN SCALES - GENERAL: PAINLEVEL_OUTOF10: 8

## 2024-03-02 ASSESSMENT — PAIN DESCRIPTION - LOCATION
LOCATION: ANKLE;TOE (COMMENT WHICH ONE)
LOCATION: ANKLE

## 2024-03-02 NOTE — CONSULTS
ORTHOPAEDIC CONSULT NOTE    Subjective:     Date of Consultation:  March 2, 2024      Danna Eller is a 33 y.o. female who is being seen for left ankle fracture/dislocation.  Pt reports slipping off step while moving some boxes.  Ambulates at baseline unassisted.   Pt. Denies head trauma/LOC during injury.  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    Patient Active Problem List    Diagnosis Date Noted    Trimalleolar fracture of ankle, closed, left, initial encounter 03/02/2024    Ankle dislocation, left, initial encounter 03/02/2024    Rapidly progressive weakness 11/01/2021     No family history on file.   Social History     Tobacco Use    Smoking status: Never    Smokeless tobacco: Never   Substance Use Topics    Alcohol use: Yes     No past medical history on file.   No past surgical history on file.   Prior to Admission medications    Medication Sig Start Date End Date Taking? Authorizing Provider   allopurinol (ZYLOPRIM) 100 MG tablet Take 1 tablet by mouth daily 11/6/21   Automatic Reconciliation, Ar   DULoxetine (CYMBALTA) 30 MG extended release capsule Take 1 capsule by mouth daily    Automatic Reconciliation, Ar   folic acid (FOLVITE) 1 MG tablet Take 1 tablet by mouth daily    Automatic Reconciliation, Ar   gabapentin (NEURONTIN) 100 MG capsule Take 2 capsules by mouth 4 times daily.    Automatic Reconciliation, Ar   melatonin 3 MG TABS tablet Take 1 tablet by mouth 11/5/21   Automatic Reconciliation, Ar   metoprolol succinate (TOPROL XL) 25 MG extended release tablet Take 1 tablet by mouth daily 11/5/21   Automatic Reconciliation, Ar   ondansetron (ZOFRAN) 4 MG tablet Take 1 tablet by mouth every 8 hours as needed    Automatic Reconciliation, Ar     No

## 2024-03-02 NOTE — DISCHARGE INSTRUCTIONS
Warning Signs  Swelling can create a lot of pressure under your cast/splint. This can lead to problems. If you experience any of the following symptoms, contact your doctor's office/on-call service immediately for advice---  Increased pain and the feeling that the splint or cast is too tight. This may be caused by swelling.   Numbness and tingling in your hand or foot. This may be caused by too much pressure on the nerves.   Burning and stinging. This may be caused by too much pressure on the skin.   Excessive swelling below the cast/splint. This may mean the cast is slowing your blood circulation.   Loss of active movement of toes or fingers. This requires an urgent evaluation [facility].    OK to remove the top two ace wraps if symptoms occur

## 2024-03-02 NOTE — ED PROVIDER NOTES
[facility] EMERGENCY DEPT  EMERGENCY DEPARTMENT ENCOUNTER       Pt Name: Danna Eller  MRN: [id number]  Birthdate 1990  Date of evaluation: 3/2/2024  Provider: Kira Palumbo PA-C   PCP: No primary care provider on file.  Note Started: 3:48 PM EST 3/2/24     CHIEF COMPLAINT       Chief Complaint   Patient presents with    Ankle Pain     BIBEMS for complaint of a slip and fall, landing on her left ankle and heard it pop.  Pt has good PMS but the ankle is swollen and possibly deformed.          HISTORY OF PRESENT ILLNESS: 1 or more elements      History From: Patient  HPI Limitations: None     Danna Eller is a 33 y.o. female who presents via EMS for evaluation of injury to left ankle that occurred just prior to arrival.  Patient states that she was moving furniture, and slipped on wet stairs.  Patient states that she heard her ankle \"pop\".  Patient denies head injury or loss of consciousness.  Denies additional injury at this time.     Nursing Notes were all reviewed and agreed with or any disagreements were addressed in the HPI.     REVIEW OF SYSTEMS      Review of Systems     Positives and Pertinent negatives as per HPI.    PAST HISTORY     Past Medical History:  No past medical history on file.    Past Surgical History:  No past surgical history on file.    Family History:  No family history on file.    Social History:  Social History     Tobacco Use    Smoking status: Never    Smokeless tobacco: Never   Substance Use Topics    Alcohol use: Yes    Drug use: Not Currently       Allergies:  Allergies   Allergen Reactions    Aspirin Swelling     Throat swelling       CURRENT MEDICATIONS      Discharge Medication List as of 3/2/2024  8:08 PM        CONTINUE these medications which have NOT CHANGED    Details   allopurinol (ZYLOPRIM) 100 MG tablet Take 1 tablet by mouth dailyHistorical Med      DULoxetine (CYMBALTA) 30 MG extended release capsule Take 1 capsule by mouth dailyHistorical Med      folic acid  proofreading.)       Kira Palumbo PA-C  03/03/24 2030

## 2024-03-06 NOTE — H&P (VIEW-ONLY)
CARE TEAM:  Patient Care Team:  Pcp, No as PCP - General (General Practice)      HISTORY OF PRESENT ILLNESS  Chief Complaint: Injury of the Left Ankle   Age: 33 y.o.    Sex: female   Hand-dominance: Right    History of present illness: Ms. Eller presents today for evaluation of left ankle injury. Fell down stairs on 3/2. Immediate and deformity. Presented to [facility] had x-rays. Ankle reduced and splinted in ER. Referred here.     She does not have insurance.      OBJECTIVE  Constitutional:  No acute distress. Her body mass index is 33.66 kg/m².   Eyes:  Sclera are nonicteric.  Respiratory:  No labored breathing.  Cardiovascular:  No marked edema.  Skin:  No marked skin ulcers.  Neurological:  No marked sensory loss noted.  Psychiatric: Alert and oriented x3.  Left ankle:  Splint in place  Able to flex/ext toes  SILT  Brisk cap refill      IMAGING / STUDIES    I have independently reviewed and interpreted x-rays of left ankle dated 3/2/24 from outside facility which demonstrate trimalleolar fracture dislocation with reduction. Small posterior malleolar fragment.      ASSESSMENT  1. Closed trimalleolar fracture of left ankle, initial encounter         PLAN  Treatment Plan:   Orders Placed This Encounter   • Surgical Posting Sheet   • BP Patient Education   • BMI Patient Education   • oxyCODONE (ROXICODONE) 5 MG immediate release tablet   • dexamethasone (DECADRON) 4 MG tablet      This is an operative injury. Reviewed surgery and risk of surgery including pain, infection, malunion, nonunion, neurovascular injury. Will work with hospital for payment plan or ernesto case given her lack of coverage with operative. Injury. Will plan for OR within the next week.

## 2024-03-12 RX ORDER — ACETAMINOPHEN 325 MG/1
650 TABLET ORAL EVERY 6 HOURS PRN
COMMUNITY

## 2024-03-12 RX ORDER — OXYCODONE HYDROCHLORIDE 5 MG/1
5 TABLET ORAL EVERY 6 HOURS PRN
Status: ON HOLD | COMMUNITY
End: 2024-03-13 | Stop reason: HOSPADM

## 2024-03-12 RX ORDER — DEXAMETHASONE 4 MG/1
4 TABLET ORAL
Status: ON HOLD | COMMUNITY
End: 2024-03-13 | Stop reason: HOSPADM

## 2024-03-12 NOTE — PROGRESS NOTES
[facility]  Preoperative Instructions        Surgery Date 3/13/2024          Time of Arrival to be called @ [phone]     1. On the day of your surgery, please report to the Surgical Services Registration Desk and sign in at your designated time. The Surgery Center is located to the right of the Emergency Room.     2. You must have someone with you to drive you home. You should not drive a car for 24 hours following surgery. Please make arrangements for a friend or family member to stay with you for the first 24 hours after your surgery.    3. Do not have anything to eat or drink (including water, gum, mints, coffee, juice) after midnight ??      .?This may not apply to medications prescribed by your physician. ?(Please note below the special instructions with medications to take the morning of your procedure.)    4. We recommend you do not drink any alcoholic beverages for 24 hours before and after your surgery.    5. Contact your surgeon’s office for instructions on the following medications: non-steroidal anti-inflammatory drugs (i.e. Advil, Aleve), vitamins, and supplements. (Some surgeon’s will want you to stop these medications prior to surgery and others may allow you to take them)  **If you are currently taking Plavix, Coumadin, Aspirin and/or other blood-thinning agents, contact your surgeon for instructions.** Your surgeon will partner with the physician prescribing these medications to determine if it is safe to stop or if you need to continue taking.  Please do not stop taking these medications without instructions from your surgeon    6. Wear comfortable clothes.  Wear glasses instead of contacts.  Do not bring any money or jewelry. Please bring picture ID, insurance card, and any prearranged co-payment or hospital payment.  Do not wear make-up, particularly mascara the morning of your surgery.  Do not wear nail polish, particularly if you are having foot /hand surgery.

## 2024-03-13 ENCOUNTER — ANESTHESIA EVENT (OUTPATIENT)
Facility: HOSPITAL | Age: 34
End: 2024-03-13

## 2024-03-13 ENCOUNTER — HOSPITAL ENCOUNTER (OUTPATIENT)
Facility: HOSPITAL | Age: 34
Setting detail: OUTPATIENT SURGERY
Discharge: HOME | End: 2024-03-13
Attending: ORTHOPAEDIC SURGERY | Admitting: ORTHOPAEDIC SURGERY

## 2024-03-13 ENCOUNTER — ANESTHESIA (OUTPATIENT)
Facility: HOSPITAL | Age: 34
End: 2024-03-13

## 2024-03-13 ENCOUNTER — APPOINTMENT (OUTPATIENT)
Facility: HOSPITAL | Age: 34
End: 2024-03-13
Attending: ORTHOPAEDIC SURGERY

## 2024-03-13 VITALS
HEART RATE: 103 BPM | TEMPERATURE: 98.3 F | WEIGHT: 212.08 LBS | SYSTOLIC BLOOD PRESSURE: 151 MMHG | DIASTOLIC BLOOD PRESSURE: 97 MMHG | OXYGEN SATURATION: 97 % | RESPIRATION RATE: 23 BRPM | BODY MASS INDEX: 37.58 KG/M2 | HEIGHT: 63 IN

## 2024-03-13 DIAGNOSIS — S82.852A TRIMALLEOLAR FRACTURE OF ANKLE, CLOSED, LEFT, INITIAL ENCOUNTER: Primary | ICD-10-CM

## 2024-03-13 LAB — HCG UR QL: NEGATIVE

## 2024-03-13 PROCEDURE — 6360000002 HC RX W HCPCS: Performed by: ORTHOPAEDIC SURGERY

## 2024-03-13 PROCEDURE — 6360000002 HC RX W HCPCS

## 2024-03-13 PROCEDURE — 2500000003 HC RX 250 WO HCPCS

## 2024-03-13 PROCEDURE — 64447 NJX AA&/STRD FEMORAL NRV IMG: CPT | Performed by: STUDENT IN AN ORGANIZED HEALTH CARE EDUCATION/TRAINING PROGRAM

## 2024-03-13 PROCEDURE — 2580000003 HC RX 258: Performed by: ANESTHESIOLOGY

## 2024-03-13 PROCEDURE — 64445 NJX AA&/STRD SCIATIC NRV IMG: CPT | Performed by: STUDENT IN AN ORGANIZED HEALTH CARE EDUCATION/TRAINING PROGRAM

## 2024-03-13 PROCEDURE — 2580000003 HC RX 258

## 2024-03-13 PROCEDURE — 81025 URINE PREGNANCY TEST: CPT

## 2024-03-13 PROCEDURE — 6360000002 HC RX W HCPCS: Performed by: STUDENT IN AN ORGANIZED HEALTH CARE EDUCATION/TRAINING PROGRAM

## 2024-03-13 PROCEDURE — 2580000003 HC RX 258: Performed by: ORTHOPAEDIC SURGERY

## 2024-03-13 PROCEDURE — 6370000000 HC RX 637 (ALT 250 FOR IP): Performed by: ORTHOPAEDIC SURGERY

## 2024-03-13 RX ORDER — SODIUM CHLORIDE 0.9 % (FLUSH) 0.9 %
5-40 SYRINGE (ML) INJECTION EVERY 12 HOURS SCHEDULED
Status: DISCONTINUED | OUTPATIENT
Start: 2024-03-13 | End: 2024-03-13 | Stop reason: HOSPADM

## 2024-03-13 RX ORDER — NALOXONE HYDROCHLORIDE 0.4 MG/ML
INJECTION, SOLUTION INTRAMUSCULAR; INTRAVENOUS; SUBCUTANEOUS PRN
Status: DISCONTINUED | OUTPATIENT
Start: 2024-03-13 | End: 2024-03-13 | Stop reason: HOSPADM

## 2024-03-13 RX ORDER — OXYCODONE HYDROCHLORIDE 5 MG/1
5 TABLET ORAL EVERY 6 HOURS PRN
Qty: 30 TABLET | Refills: 0 | Status: SHIPPED | OUTPATIENT
Start: 2024-03-13 | End: 2024-03-18

## 2024-03-13 RX ORDER — FENTANYL CITRATE 50 UG/ML
INJECTION, SOLUTION INTRAMUSCULAR; INTRAVENOUS PRN
Status: DISCONTINUED | OUTPATIENT
Start: 2024-03-13 | End: 2024-03-13 | Stop reason: SDUPTHER

## 2024-03-13 RX ORDER — LIDOCAINE HYDROCHLORIDE 20 MG/ML
INJECTION, SOLUTION EPIDURAL; INFILTRATION; INTRACAUDAL; PERINEURAL PRN
Status: DISCONTINUED | OUTPATIENT
Start: 2024-03-13 | End: 2024-03-13 | Stop reason: SDUPTHER

## 2024-03-13 RX ORDER — BUPIVACAINE HYDROCHLORIDE 5 MG/ML
INJECTION, SOLUTION EPIDURAL; INTRACAUDAL PRN
Status: DISCONTINUED | OUTPATIENT
Start: 2024-03-13 | End: 2024-03-13 | Stop reason: SDUPTHER

## 2024-03-13 RX ORDER — SODIUM CHLORIDE 9 MG/ML
INJECTION, SOLUTION INTRAVENOUS PRN
Status: DISCONTINUED | OUTPATIENT
Start: 2024-03-13 | End: 2024-03-13 | Stop reason: HOSPADM

## 2024-03-13 RX ORDER — ONDANSETRON 2 MG/ML
INJECTION INTRAMUSCULAR; INTRAVENOUS PRN
Status: DISCONTINUED | OUTPATIENT
Start: 2024-03-13 | End: 2024-03-13 | Stop reason: SDUPTHER

## 2024-03-13 RX ORDER — SODIUM CHLORIDE, SODIUM LACTATE, POTASSIUM CHLORIDE, CALCIUM CHLORIDE 600; 310; 30; 20 MG/100ML; MG/100ML; MG/100ML; MG/100ML
INJECTION, SOLUTION INTRAVENOUS CONTINUOUS PRN
Status: DISCONTINUED | OUTPATIENT
Start: 2024-03-13 | End: 2024-03-13 | Stop reason: SDUPTHER

## 2024-03-13 RX ORDER — MIDAZOLAM HYDROCHLORIDE 1 MG/ML
INJECTION INTRAMUSCULAR; INTRAVENOUS PRN
Status: DISCONTINUED | OUTPATIENT
Start: 2024-03-13 | End: 2024-03-13 | Stop reason: SDUPTHER

## 2024-03-13 RX ORDER — TRANEXAMIC ACID 100 MG/ML
INJECTION, SOLUTION INTRAVENOUS PRN
Status: DISCONTINUED | OUTPATIENT
Start: 2024-03-13 | End: 2024-03-13

## 2024-03-13 RX ORDER — HYDROMORPHONE HYDROCHLORIDE 2 MG/ML
INJECTION, SOLUTION INTRAMUSCULAR; INTRAVENOUS; SUBCUTANEOUS PRN
Status: DISCONTINUED | OUTPATIENT
Start: 2024-03-13 | End: 2024-03-13 | Stop reason: SDUPTHER

## 2024-03-13 RX ORDER — ONDANSETRON 2 MG/ML
4 INJECTION INTRAMUSCULAR; INTRAVENOUS
Status: DISCONTINUED | OUTPATIENT
Start: 2024-03-13 | End: 2024-03-13 | Stop reason: HOSPADM

## 2024-03-13 RX ORDER — PROPOFOL 10 MG/ML
INJECTION, EMULSION INTRAVENOUS PRN
Status: DISCONTINUED | OUTPATIENT
Start: 2024-03-13 | End: 2024-03-13 | Stop reason: SDUPTHER

## 2024-03-13 RX ORDER — OXYCODONE HYDROCHLORIDE 5 MG/1
5 TABLET ORAL
Status: COMPLETED | OUTPATIENT
Start: 2024-03-13 | End: 2024-03-13

## 2024-03-13 RX ORDER — DEXMEDETOMIDINE HYDROCHLORIDE 100 UG/ML
INJECTION, SOLUTION INTRAVENOUS PRN
Status: DISCONTINUED | OUTPATIENT
Start: 2024-03-13 | End: 2024-03-13 | Stop reason: SDUPTHER

## 2024-03-13 RX ORDER — OXYCODONE HYDROCHLORIDE 5 MG/1
5 TABLET ORAL EVERY 6 HOURS PRN
Qty: 20 TABLET | Refills: 0 | Status: SHIPPED | OUTPATIENT
Start: 2024-03-13 | End: 2024-03-13

## 2024-03-13 RX ORDER — DEXAMETHASONE SODIUM PHOSPHATE 4 MG/ML
INJECTION, SOLUTION INTRA-ARTICULAR; INTRALESIONAL; INTRAMUSCULAR; INTRAVENOUS; SOFT TISSUE PRN
Status: DISCONTINUED | OUTPATIENT
Start: 2024-03-13 | End: 2024-03-13 | Stop reason: SDUPTHER

## 2024-03-13 RX ORDER — SODIUM CHLORIDE, SODIUM LACTATE, POTASSIUM CHLORIDE, CALCIUM CHLORIDE 600; 310; 30; 20 MG/100ML; MG/100ML; MG/100ML; MG/100ML
INJECTION, SOLUTION INTRAVENOUS ONCE
Status: COMPLETED | OUTPATIENT
Start: 2024-03-13 | End: 2024-03-13

## 2024-03-13 RX ORDER — IPRATROPIUM BROMIDE AND ALBUTEROL SULFATE 2.5; .5 MG/3ML; MG/3ML
1 SOLUTION RESPIRATORY (INHALATION)
Status: DISCONTINUED | OUTPATIENT
Start: 2024-03-13 | End: 2024-03-13 | Stop reason: HOSPADM

## 2024-03-13 RX ORDER — HYDROMORPHONE HYDROCHLORIDE 1 MG/ML
0.5 INJECTION, SOLUTION INTRAMUSCULAR; INTRAVENOUS; SUBCUTANEOUS EVERY 5 MIN PRN
Status: DISCONTINUED | OUTPATIENT
Start: 2024-03-13 | End: 2024-03-13 | Stop reason: HOSPADM

## 2024-03-13 RX ORDER — FENTANYL CITRATE 50 UG/ML
25 INJECTION, SOLUTION INTRAMUSCULAR; INTRAVENOUS EVERY 5 MIN PRN
Status: DISCONTINUED | OUTPATIENT
Start: 2024-03-13 | End: 2024-03-13 | Stop reason: HOSPADM

## 2024-03-13 RX ORDER — DEXTROSE MONOHYDRATE 100 MG/ML
INJECTION, SOLUTION INTRAVENOUS CONTINUOUS PRN
Status: DISCONTINUED | OUTPATIENT
Start: 2024-03-13 | End: 2024-03-13 | Stop reason: HOSPADM

## 2024-03-13 RX ORDER — SODIUM CHLORIDE 0.9 % (FLUSH) 0.9 %
5-40 SYRINGE (ML) INJECTION PRN
Status: DISCONTINUED | OUTPATIENT
Start: 2024-03-13 | End: 2024-03-13 | Stop reason: HOSPADM

## 2024-03-13 RX ORDER — PROCHLORPERAZINE EDISYLATE 5 MG/ML
5 INJECTION INTRAMUSCULAR; INTRAVENOUS
Status: DISCONTINUED | OUTPATIENT
Start: 2024-03-13 | End: 2024-03-13 | Stop reason: HOSPADM

## 2024-03-13 RX ADMIN — PROPOFOL 180 MG: 10 INJECTION, EMULSION INTRAVENOUS at 09:53

## 2024-03-13 RX ADMIN — FENTANYL CITRATE 100 MCG: 50 INJECTION, SOLUTION INTRAMUSCULAR; INTRAVENOUS at 09:20

## 2024-03-13 RX ADMIN — FENTANYL CITRATE 50 MCG: 50 INJECTION, SOLUTION INTRAMUSCULAR; INTRAVENOUS at 10:15

## 2024-03-13 RX ADMIN — MIDAZOLAM HYDROCHLORIDE 1 MG: 1 INJECTION, SOLUTION INTRAMUSCULAR; INTRAVENOUS at 09:26

## 2024-03-13 RX ADMIN — DEXMEDETOMIDINE HYDROCHLORIDE 4 MCG: 100 INJECTION, SOLUTION, CONCENTRATE INTRAVENOUS at 10:29

## 2024-03-13 RX ADMIN — HYDROMORPHONE HYDROCHLORIDE 0.25 MG: 2 INJECTION INTRAMUSCULAR; INTRAVENOUS; SUBCUTANEOUS at 10:41

## 2024-03-13 RX ADMIN — SODIUM CHLORIDE, POTASSIUM CHLORIDE, SODIUM LACTATE AND CALCIUM CHLORIDE: 600; 310; 30; 20 INJECTION, SOLUTION INTRAVENOUS at 11:05

## 2024-03-13 RX ADMIN — DEXMEDETOMIDINE HYDROCHLORIDE 6 MCG: 100 INJECTION, SOLUTION, CONCENTRATE INTRAVENOUS at 10:21

## 2024-03-13 RX ADMIN — MIDAZOLAM HYDROCHLORIDE 2 MG: 1 INJECTION, SOLUTION INTRAMUSCULAR; INTRAVENOUS at 09:20

## 2024-03-13 RX ADMIN — SODIUM CHLORIDE, POTASSIUM CHLORIDE, SODIUM LACTATE AND CALCIUM CHLORIDE: 600; 310; 30; 20 INJECTION, SOLUTION INTRAVENOUS at 09:49

## 2024-03-13 RX ADMIN — OXYCODONE 5 MG: 5 TABLET ORAL at 12:34

## 2024-03-13 RX ADMIN — WATER 2000 MG: 1 INJECTION INTRAMUSCULAR; INTRAVENOUS; SUBCUTANEOUS at 10:01

## 2024-03-13 RX ADMIN — LIDOCAINE HYDROCHLORIDE 80 MG: 20 INJECTION, SOLUTION EPIDURAL; INFILTRATION; INTRACAUDAL; PERINEURAL at 09:53

## 2024-03-13 RX ADMIN — HYDROMORPHONE HYDROCHLORIDE 0.5 MG: 2 INJECTION INTRAMUSCULAR; INTRAVENOUS; SUBCUTANEOUS at 10:54

## 2024-03-13 RX ADMIN — Medication 3 AMPULE: at 08:05

## 2024-03-13 RX ADMIN — MIDAZOLAM HYDROCHLORIDE 1 MG: 1 INJECTION, SOLUTION INTRAMUSCULAR; INTRAVENOUS at 09:47

## 2024-03-13 RX ADMIN — ONDANSETRON HYDROCHLORIDE 4 MG: 2 INJECTION, SOLUTION INTRAMUSCULAR; INTRAVENOUS at 10:01

## 2024-03-13 RX ADMIN — HYDROMORPHONE HYDROCHLORIDE 0.5 MG: 2 INJECTION INTRAMUSCULAR; INTRAVENOUS; SUBCUTANEOUS at 11:16

## 2024-03-13 RX ADMIN — HYDROMORPHONE HYDROCHLORIDE 0.25 MG: 2 INJECTION INTRAMUSCULAR; INTRAVENOUS; SUBCUTANEOUS at 10:36

## 2024-03-13 RX ADMIN — SODIUM CHLORIDE, POTASSIUM CHLORIDE, SODIUM LACTATE AND CALCIUM CHLORIDE: 600; 310; 30; 20 INJECTION, SOLUTION INTRAVENOUS at 08:04

## 2024-03-13 RX ADMIN — HYDROMORPHONE HYDROCHLORIDE 0.5 MG: 2 INJECTION INTRAMUSCULAR; INTRAVENOUS; SUBCUTANEOUS at 11:36

## 2024-03-13 RX ADMIN — DEXAMETHASONE SODIUM PHOSPHATE 8 MG: 4 INJECTION, SOLUTION INTRAMUSCULAR; INTRAVENOUS at 10:01

## 2024-03-13 RX ADMIN — FENTANYL CITRATE 50 MCG: 50 INJECTION, SOLUTION INTRAMUSCULAR; INTRAVENOUS at 09:53

## 2024-03-13 RX ADMIN — BUPIVACAINE HYDROCHLORIDE 30 ML: 5 INJECTION, SOLUTION EPIDURAL; INTRACAUDAL; PERINEURAL at 09:24

## 2024-03-13 ASSESSMENT — PAIN SCALES - GENERAL
PAINLEVEL_OUTOF10: 0
PAINLEVEL_OUTOF10: 5
PAINLEVEL_OUTOF10: 0

## 2024-03-13 ASSESSMENT — PAIN DESCRIPTION - DESCRIPTORS: DESCRIPTORS: ACHING

## 2024-03-13 ASSESSMENT — PAIN DESCRIPTION - ORIENTATION: ORIENTATION: LEFT

## 2024-03-13 ASSESSMENT — PAIN DESCRIPTION - LOCATION: LOCATION: ANKLE

## 2024-03-13 NOTE — ANESTHESIA PROCEDURE NOTES
Peripheral Block    Patient location during procedure: holding area  Reason for block: post-op pain management and at surgeon's request  Start time: 3/13/2024 9:20 AM  End time: 3/13/2024 9:30 AM  Staffing  Performed: anesthesiologist   Anesthesiologist: Lazaro Roman MD  Performed by: Lazaro Roman MD  Authorized by: Lazaro Roman MD    Preanesthetic Checklist  Completed: patient identified, IV checked, site marked, risks and benefits discussed, surgical/procedural consents, equipment checked, pre-op evaluation, timeout performed, anesthesia consent given, oxygen available, monitors applied/VS acknowledged, fire risk safety assessment completed and verbalized and blood product R/B/A discussed and consented  Peripheral Block   Patient position: supine  Prep: ChloraPrep  Provider prep: mask and sterile gloves  Patient monitoring: cardiac monitor, continuous pulse ox, continuous capnometry, frequent blood pressure checks, IV access, oxygen and responsive to questions  Block type: Saphenous  Laterality: left  Injection technique: single-shot  Guidance: ultrasound guided    Needle   Needle type: insulated echogenic nerve stimulator needle   Needle gauge: 20 G  Needle localization: ultrasound guidance  Needle length: 10 cm  Assessment   Injection assessment: negative aspiration for heme, no paresthesia on injection, local visualized surrounding nerve on ultrasound and no intravascular symptoms  Paresthesia pain: none  Slow fractionated injection: yes  Hemodynamics: stable  Outcomes: uncomplicated and patient tolerated procedure well

## 2024-03-13 NOTE — OP NOTE
OPERATIVE REPORT    FACILITY: [facility]    PATIENT NAME: Jessica Eller     DATE OF OPERATION: 3/13/24    PREOPERATIVE DIAGNOSIS:  Left trimalleolar ankle fracture    POSTOPERATIVE DIAGNOSIS:   Left trimalleolar ankle fracture.    SURGERIES PERFORMED:   Open reduction with internal fixation left trimalleolar ankle fracture without fixation of posterior malleolus    ATTENDING PHYSICIAN: Hollis Livingston MD    ASSISTANT: Roxanne Lewis    IMPLANTS:  [place] Variax 5 hole distal fibular locking plate, 6 hole 2.4 mm minifrag antiglide plate, 4.0 x 60 ASNIS screw    SPECIMENS:  none    OPERATIVE FINDINGS:  stable fixation    ANESTHESIA: general plus block    FLUIDS: Please see anesthesia record    ESTIMATED BLOOD LOSS: 10     TOURNIQUET TIME: 63 min    INDICATIONS FOR PROCEDURE: This patient is a 33 y.o. female who presented to our institution with left ankle trimalleolar fracture dislocation. After a detailed discussion regarding the risks, benefits, and alternatives to surgery, informed consent was obtained. The patient presents now to the operating room for that operative procedure.     DETAILED DESCRIPTION OF PROCEDURE: The patient was identified in preoperative holding. The operative extremity was marked. The patient was then taken back to the operating room where the anesthetic of choice was administered. The patient was positioned on the operating room table taking care to pad all bony prominences. The operative site was prepped and draped in the usual sterile fashion. Appropriate antibiotics were administered and timeouts were performed in keeping with guidelines.    Following this, an Esmarch was used to exsanguinate the limb. The tourniquet was inflated. A lateral incision was made of the peroneal fascia and then the peroneal fascia was incised longitudinally off the posterior border of the fibula. The superficial peroneal nerve was identified and protected anteriorly. Following this, subperiosteal exposure was done

## 2024-03-13 NOTE — INTERVAL H&P NOTE
Update History & Physical    The patient's History and Physical of March 5, 2024 was reviewed with the patient and I examined the patient. There was no change. The surgical site was confirmed by the patient and me.     Plan: The risks, benefits, expected outcome, and alternative to the recommended procedure have been discussed with the patient. Patient understands and wants to proceed with the procedure.     Electronically signed by Hollis Livingston MD on 3/13/2024 at 7:16 AM

## 2024-03-13 NOTE — FLOWSHEET NOTE
03/13/24 1248   Discharge Checklist   Ride and Caregiver Arranged Yes   Ride Caregiver Provider Jennie, mother   Responsible party will drive the patient home Yes   Mobility at Departure Ambulatory   Departure Mode With family   AVS Reviewed   AVS & discharge instructions reviewed with patient and/or representative? Yes   Reviewed instructions with Patient;Other (name and relationship in comment)  (mother, Jennie)   Level of Understanding Questions answered;Verbalized understanding

## 2024-03-13 NOTE — ANESTHESIA PROCEDURE NOTES
Peripheral Block    Patient location during procedure: holding area  Reason for block: post-op pain management and at surgeon's request  Start time: 3/13/2024 9:20 AM  End time: 3/13/2024 9:30 AM  Staffing  Performed: anesthesiologist   Anesthesiologist: Lazaro Roman MD  Performed by: Lazaro Roman MD  Authorized by: Lazaro Roman MD    Preanesthetic Checklist  Completed: patient identified, IV checked, site marked, risks and benefits discussed, surgical/procedural consents, equipment checked, pre-op evaluation, timeout performed, anesthesia consent given, oxygen available, monitors applied/VS acknowledged, fire risk safety assessment completed and verbalized and blood product R/B/A discussed and consented  Peripheral Block   Patient position: supine  Prep: ChloraPrep  Provider prep: mask and sterile gloves  Patient monitoring: cardiac monitor, continuous pulse ox, continuous capnometry, frequent blood pressure checks, IV access, oxygen and responsive to questions  Block type: Sciatic  Popliteal  Laterality: left  Injection technique: single-shot  Guidance: ultrasound guided    Needle   Needle type: insulated echogenic nerve stimulator needle   Needle gauge: 20 G  Needle localization: ultrasound guidance  Needle length: 10 cm  Assessment   Injection assessment: negative aspiration for heme, no paresthesia on injection, local visualized surrounding nerve on ultrasound and no intravascular symptoms  Paresthesia pain: immediately resolved  Slow fractionated injection: yes  Hemodynamics: stable  Outcomes: uncomplicated and patient tolerated procedure well

## 2024-03-13 NOTE — ANESTHESIA POSTPROCEDURE EVALUATION
Department of Anesthesiology  Postprocedure Note    Patient: Jessica Eller  MRN: [id number]  YOB: 1990  Date of evaluation: 3/13/2024    Procedure Summary       Date: 03/13/24 Room / Location: [facility] MAIN OR M8 / [facility] MAIN OR    Anesthesia Start: 0949 Anesthesia Stop: 1143    Procedure: LEFT ANKLE OPEN REDUCTION INTERNAL FIXATION  (GEN W/BLOCK) (Left: Ankle) Diagnosis:       Closed trimalleolar fracture of left ankle, initial encounter      (Closed trimalleolar fracture of left ankle, initial encounter [S82.852A])    Providers: Hollis Livingston MD Responsible Provider: Lazaro Roman MD    Anesthesia Type: General ASA Status: 2            Anesthesia Type: General    Brent Phase I: Brent Score: 10    Brent Phase II:      Anesthesia Post Evaluation    Patient location during evaluation: PACU  Patient participation: complete - patient participated  Level of consciousness: awake and alert  Airway patency: patent  Nausea & Vomiting: no nausea  Cardiovascular status: hemodynamically stable  Respiratory status: acceptable  Hydration status: euvolemic  Multimodal analgesia pain management approach  Pain management: adequate    No notable events documented.

## 2024-03-13 NOTE — DISCHARGE INSTRUCTIONS
support stockings, you may remove them after 24 hours. Support stockings are used to help prevent blood clots in the legs following surgery.    TO PREVENT AN INFECTION      WASH YOUR HANDS    To prevent infection, good handwashing is the most important thing you or your caregiver can do.      Wash your hands with soap and water or use the hand  we gave you before you touch any wounds.    SHOWER    Use the antibacterial soap we gave you when you take a shower.     Shower with this soap until your wounds are healed.      To reach all areas of your body, you may need someone to help you.     Don’t forget to clean your belly button with every shower.     USE CLEAN SHEETS    Use freshly cleaned sheets on your bed after surgery.     To keep the surgery site clean, do not allow pets to sleep with you while your wound is still healing.     STOP SMOKING    Stop smoking, or at least cut back on smoking    Smoking slows your healing.      CONTROL YOUR BLOOD SUGAR    High blood sugars slow wound healing.    If you are diabetic, control your blood sugar levels before and after your surgery.    Please take time to review all of your Home Care Instructions and Medication Information sheets provided in your discharge packet. If you have any questions, please contact your surgeon's office. Thank you.    The discharge information has been reviewed with the patient and instruction recipient.  The patient and instruction recipient verbalized understanding.  Discharge medications reviewed with the patient and instruction recipient and appropriate educational materials and side effects teaching were provided.      Please provide this summary of care documentation to your next provider.

## 2024-03-13 NOTE — ANESTHESIA PRE PROCEDURE
Department of Anesthesiology  Preprocedure Note       Name:  Jessica Eller   Age:  33 y.o.  :  1990                                          MRN:  [id number]         Date:  3/13/2024      Surgeon: Surgeon(s):  Hollis Livingston MD    Procedure: Procedure(s):  LEFT ANKLE OPEN REDUCTION INTERNAL FIXATION  (GEN W/BLOCK)    Medications prior to admission:   Prior to Admission medications    Medication Sig Start Date End Date Taking? Authorizing Provider   acetaminophen (TYLENOL) 325 MG tablet Take 2 tablets by mouth every 6 hours as needed for Pain   Yes Jd Lion MD   oxyCODONE (ROXICODONE) 5 MG immediate release tablet Take 1 tablet by mouth every 6 hours as needed for Pain.   Yes Jd Lion MD   dexAMETHasone (DECADRON) 4 MG tablet Take 1 tablet by mouth twice a week Dose on 3/12/2024 and 3/13/2024   Yes dJ Lion MD       Current medications:    Current Facility-Administered Medications   Medication Dose Route Frequency Provider Last Rate Last Admin    alcohol 62% (NOZIN) nasal  3 ampule  3 ampule Nasal Once Hollis Livingston MD        lactated ringers IV soln infusion   IntraVENous Once Toni Escobedo MD        ceFAZolin (ANCEF) 2,000 mg in sterile water 20 mL IV syringe  2,000 mg IntraVENous Once Hollis Livingston MD           Allergies:    Allergies   Allergen Reactions    Latex Itching, Rash and Other (See Comments)     Itchy and red skin     Antihistamines, Diphenhydramine-Type Hives, Shortness Of Breath and Swelling    Aspirin Swelling     Throat swelling       Problem List:    Patient Active Problem List   Diagnosis Code    Rapidly progressive weakness R53.1    Trimalleolar fracture of ankle, closed, left, initial encounter S82.852A    Ankle dislocation, left, initial encounter S93.05XA       Past Medical History:        Diagnosis Date    Anxiety and depression     Arthritis     At risk for sleep apnea 2024    patient reports being tested about 5 years